# Patient Record
Sex: MALE | Race: WHITE | NOT HISPANIC OR LATINO | Employment: FULL TIME | URBAN - NONMETROPOLITAN AREA
[De-identification: names, ages, dates, MRNs, and addresses within clinical notes are randomized per-mention and may not be internally consistent; named-entity substitution may affect disease eponyms.]

---

## 2017-03-17 ENCOUNTER — HISTORY (OUTPATIENT)
Dept: EMERGENCY MEDICINE | Facility: OTHER | Age: 22
End: 2017-03-17

## 2018-02-13 ENCOUNTER — DOCUMENTATION ONLY (OUTPATIENT)
Dept: FAMILY MEDICINE | Facility: OTHER | Age: 23
End: 2018-02-13

## 2018-02-13 PROBLEM — J30.1 ALLERGIC RHINITIS DUE TO POLLEN: Status: ACTIVE | Noted: 2018-02-13

## 2018-02-13 PROBLEM — F41.1 ANXIETY STATE: Status: ACTIVE | Noted: 2018-02-13

## 2018-02-13 PROBLEM — H53.009 AMBLYOPIA: Status: ACTIVE | Noted: 2018-02-13

## 2018-02-13 PROBLEM — F91.9 DISTURBANCE OF CONDUCT: Status: ACTIVE | Noted: 2018-02-13

## 2018-02-13 RX ORDER — ALBUTEROL SULFATE 90 UG/1
2 AEROSOL, METERED RESPIRATORY (INHALATION) EVERY 4 HOURS PRN
COMMUNITY
Start: 2017-03-17

## 2018-04-27 ENCOUNTER — HOSPITAL ENCOUNTER (EMERGENCY)
Facility: OTHER | Age: 23
Discharge: HOME | End: 2018-04-27
Attending: INTERNAL MEDICINE | Admitting: INTERNAL MEDICINE
Payer: COMMERCIAL

## 2018-04-27 ENCOUNTER — APPOINTMENT (OUTPATIENT)
Dept: GENERAL RADIOLOGY | Facility: OTHER | Age: 23
End: 2018-04-27
Attending: INTERNAL MEDICINE
Payer: COMMERCIAL

## 2018-04-27 VITALS
TEMPERATURE: 96.3 F | SYSTOLIC BLOOD PRESSURE: 110 MMHG | RESPIRATION RATE: 12 BRPM | HEART RATE: 72 BPM | DIASTOLIC BLOOD PRESSURE: 69 MMHG | OXYGEN SATURATION: 99 % | HEIGHT: 71 IN

## 2018-04-27 DIAGNOSIS — M54.50 ACUTE MIDLINE LOW BACK PAIN WITHOUT SCIATICA: Primary | ICD-10-CM

## 2018-04-27 PROCEDURE — 99283 EMERGENCY DEPT VISIT LOW MDM: CPT | Performed by: INTERNAL MEDICINE

## 2018-04-27 PROCEDURE — 72100 X-RAY EXAM L-S SPINE 2/3 VWS: CPT

## 2018-04-27 PROCEDURE — 99283 EMERGENCY DEPT VISIT LOW MDM: CPT | Mod: Z6 | Performed by: INTERNAL MEDICINE

## 2018-04-27 ASSESSMENT — ENCOUNTER SYMPTOMS
DIZZINESS: 0
HEMATURIA: 0
DYSURIA: 0
ARTHRALGIAS: 0
JOINT SWELLING: 0
AGITATION: 0
ABDOMINAL PAIN: 0
NECK PAIN: 0
LIGHT-HEADEDNESS: 0
BACK PAIN: 1
SHORTNESS OF BREATH: 0
BRUISES/BLEEDS EASILY: 0

## 2018-04-27 NOTE — DISCHARGE INSTRUCTIONS

## 2018-04-27 NOTE — ED PROVIDER NOTES
History     Chief Complaint   Patient presents with     Back Pain   Mello Wilson is a 22 year old male who:    [facility] Comments: Low back pain    Patient states he has been having some constant low back pain, middle, localized, none radiation.  This been going on for about 1 week.  Started after he tried lifting and hauling a [employer] trailer and trailer tongue.  States he tried to put the trailer back onto his truck and then afterwards started having some localized back pain.  Nothing radiating down the legs.    He has been trying to limit activity.  Wearing a back brace intermittently.  Rarely using some Tylenol.  Put a month to put himself on a 50 pound lift restriction since that time.    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  He is also been renovating his home.    He would like to try getting some back x-rays today he is worried about back issue or back injury.    No other injuries reported.  No bowel or bladder incontinence.  No saddle numbness.  No other limitations reported.    The history is provided by the patient.     Problem List:    Patient Active Problem List    Diagnosis Date Noted     Acute midline low back pain without sciatica -- Approximately L5 04/27/2018     Priority: Medium     Allergic rhinitis due to pollen 02/13/2018     Priority: Medium     Overview:   spring and fall       Amblyopia 02/13/2018     Priority: Medium     Overview:   Dense (congenital)       Anxiety state 02/13/2018     Priority: Medium     Overview:   Features of agitation and possible dysthymia       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     Priority: Medium     Ingrown toenail 08/04/2012     Priority: Medium     Insomnia 08/16/2011     Priority: Medium     Asthma 05/04/2011     Priority: Medium     Overview:   No Asthma Action Plan developed       Elbow, forearm, and wrist, abrasion or friction burn, without mention of infection 04/29/2011     Priority: Medium        Past Medical History:    Past Medical History:  "  Diagnosis Date     Attention-deficit hyperactivity disorder, combined type      Child in welfare custody        Past Surgical History:    Past Surgical History:   Procedure Laterality Date     OTHER SURGICAL HISTORY      ,[id number],OTHER, Dr. Vaughn.     OTHER SURGICAL HISTORY      [id number],OTHER,left foot surgery after stepped on needle.       Family History:    Family History   Problem Relation Age of Onset     Other - See Comments Sister       shunt     DIABETES Maternal Grandmother      Diabetes     Other - See Comments Paternal Grandmother       after being on ventilator was a smoker       Social History:  Marital Status:  Single [1]  Social History   Substance Use Topics     Smoking status: Former Smoker     Packs/day: 0.10     Types: Cigarettes     Smokeless tobacco: Current User     Types: Chew     Alcohol use No        Medications:      albuterol (PROAIR HFA/PROVENTIL HFA/VENTOLIN HFA) 108 (90 BASE) MCG/ACT Inhaler         Review of Systems   HENT: Negative for congestion.    Respiratory: Negative for shortness of breath.    Cardiovascular: Negative for chest pain.   Gastrointestinal: Negative for abdominal pain.   Genitourinary: Negative for dysuria and hematuria.   Musculoskeletal: Positive for back pain. Negative for arthralgias, gait problem, joint swelling and neck pain.   Neurological: Negative for dizziness and light-headedness.   Hematological: Does not bruise/bleed easily.   Psychiatric/Behavioral: Negative for agitation.       Physical Exam   BP: 110/69  Pulse: 72  Temp: 96.3  F (35.7  C)  Resp: 12  Height: 180.3 cm (5' 11\")  SpO2: 99 %      Physical Exam   Constitutional: He appears well-developed and well-nourished. No distress.   HENT:   Head: Normocephalic and atraumatic.   Cardiovascular: Normal rate and intact distal pulses.    Pulmonary/Chest: Effort normal.   Musculoskeletal: He exhibits tenderness. He exhibits no deformity.   Mild tenderness to palpation of the L5 spinous process.  " No bruising or bleeding.  No swelling.   Neurological: He is alert.   Skin: Skin is warm and dry.   Psychiatric: He has a normal mood and affect.       ED Course     Patient was evaluated and treated.  Only minimal low back pain.  He is able to flex and extend his back with minimal difficulty.  Some localized tenderness approximately L5 spinous process to palpation.    X-rays suggest possibility of some anterior changes of L1.    Conservative measures recommended.  No heavy lifting.  Proper lifting mechanics and bending.  He is Carl started this.  He has been using a back brace intermittently which seems to help some as well.  Her graph outpatient follow-up with primary care provider as needed.    Continue back brace, ice, Tylenol as needed.    ED Course     Procedures               Results for orders placed or performed during the hospital encounter of 04/27/18 (from the past 24 hour(s))   XR Lumbar Spine 2/3 Views    Narrative    XR LUMBAR SPINE 2-3 VIEWS    HISTORY: 22 yearsMale localized low back pain - about L5, from heavy  lifting - ? compression fracture; Acute midline low back pain without  sciatica    TECHNIQUE: 3 views lumbar spine    COMPARISON: None    FINDINGS: Vertebral body height is well-maintained throughout. There  is slight irregularity or buckling of the inferior aspect of the  anterior L1 endplate. There is no evidence of subluxation or fracture  of the lumbar spine otherwise.         Impression    IMPRESSION: Subtle buckling of the inferior aspect of the anterior L1  endplate. A very mild compression fracture of L1 is possible.  Differential would include anterior osteophytic change. MRI may be  useful to confirm presence or absence of fracture if clinically  indicated.    ANISA HOLGUIN MD       Medications - No data to display    Assessments & Plan (with Medical Decision Making)     I have reviewed the nursing notes.    I have reviewed the findings, diagnosis, plan and need for follow up  with the patient.    New Prescriptions    No medications on file       Final diagnoses:   Acute midline low back pain without sciatica -- Approximately L5       4/27/2018   [facility] AND [facility] [place]     Yariel Garsia MD  04/27/18 8605

## 2018-04-27 NOTE — ED AVS SNAPSHOT
[facility]    [address]    [place] [address]    Phone:  [phone]    Fax:  [phone]                                       Mello Wilson   MRN: [id number]    Department:  [facility]   Date of Visit:  4/27/2018           Patient Information     Date Of Birth          1995        Your diagnoses for this visit were:     Acute midline low back pain without sciatica -- Approximately L5        You were seen by Yariel Garsia MD.      Follow-up Information     Please follow up.    Why:  Follow-up with primary care provider as needed.        Discharge Instructions         General Neck and Back Pain    Both neck and back pain are usually caused by injury to the muscles or ligaments of the spine. Sometimes the disks that separate each bone of the spine may cause pain by pressing on a nearby nerve. Back and neck pain may appear after a sudden twisting or bending force (such as in a car accident), or sometimes after a simple awkward movement. In either case, muscle spasm is often present and adds to the pain.  Acute neck and back pain usually gets better in 1 to 2 weeks. Pain related to disk disease, arthritis in the spinal joints or spinal stenosis (narrowing of the spinal canal) can become chronic and last for months or years.  Back and neck pain are common problems. Most people feel better in 1 or 2 weeks, and most of the rest in 1 to 2 months. Most people can remain active.  People have and describe pain differently.    Pain can be sharp, stabbing, shooting, aching, cramping, or burning    Movement, standing, bending, lifting, sitting, or walking may worsen the pain    Pain can be localized to one spot or area, or it can be more generalized    Pain can spread or radiate upwards, downwards, to the front, or go down your arms    Muscle spasm may occur.  Most of the time mechanical problems with the muscles or spine cause the pain. it is usually  caused by an injury, whether known or not, to the muscles or ligaments. While illnesses can cause back pain, it is usually not caused by a serious illness. Pain is usually related to physical activity, whether sports, exercise, work, or normal activity. Sometimes it can occur without an identifiable cause. This can happen simply by stretching or moving wrong, without noting pain at the time. Other causes include:    Overexertion, lifting, pushing, pulling incorrectly or too aggressively.    Sudden twisting, bending or stretching from an accident (car or fall), or accidental movement.    Poor posture    Poor conditioning, lack of regular exercise    Spinal disc disease or arthritis    Stress    Pregnancy, or illness like appendicitis, bladder or kidney infection, pelvic infections   Home care    For neck pain: Use a comfortable pillow that supports the head and keeps the spine in a neutral position. The position of the head should not be tilted forward or backward.    When in bed, try to find a position of comfort. A firm mattress is best. Try lying flat on your back with pillows under your knees. You can also try lying on your side with your knees bent up towards your chest and a pillow between your knees.    At first, do not try to stretch out the sore spots. If there is a strain, it is not like the good soreness you get after exercising without an injury. In this case, stretching may make it worse.    Don't sit for long periods, as in long car rides or other travel. This puts more stress on the lower back than standing or walking.    During the first 24 to 72 hours after an injury, apply an ice pack to the painful area for 20 minutes and then remove it for 20 minutes over a period of 60 to 90 minutes or several times a day.     You can alternate ice and heat therapies. Talk with your healthcare provider about the best treatment for your back or neck pain. As a safety precaution, do not use a heating pad at  bedtime. Sleeping with a heating pad can lead to skin burns or tissue damage.    Therapeutic massage can help relax the back and neck muscles without stretching them.    Be aware of safe lifting methods and do not lift anything over 15 pounds until all the pain is gone.  Medicines  Talk to your healthcare provider before using medicine, especially if you have other medical problems or are taking other medicines.    You may use over-the-counter medicine to control pain, unless another pain medicine was prescribed. If you have chronic conditions like diabetes, liver or kidney disease, stomach ulcers,  gastrointestinal bleeding, or are taking blood thinner medicines.    Be careful if you are given pain medicines, narcotics, or medicine for muscle spasm. They can cause drowsiness, and can affect your coordination, reflexes, and judgment. Do not drive or operate heavy machinery.  Follow-up care  Follow up with your healthcare provider, or as advised. Physical therapy or further tests may be needed.  If X-rays were taken, you will be notified of any new findings that may affect your care.  Call 911  Call 911 if any of the following occur:    Trouble breathing    Confusion    Very drowsy or trouble awakening    Fainting or loss of consciousness    Rapid or very slow heart rate    Loss of bowel or bladder control  When to seek medical advice  Call your healthcare provider right away if any of these occur:    Pain becomes worse or spreads into your arms or legs    Weakness, numbness or pain in one or both arms or legs    Numbness in the groin area    Difficulty walking    Fever of 100.4 F (38 C) or higher, or as directed by your healthcare provider  Date Last Reviewed: 7/1/2016 2000-2017 [employer]. [address] [place] [address], [place] [address]. All rights reserved. This information is not intended as a substitute for professional medical care. Always follow your healthcare professional's  instructions.          Discharge References/Attachments     BACK SPRAIN/STRAIN (ENGLISH)      24 Hour Appointment Hotline       To make an appointment at any [facility], call [phone] ([phone]). If you don't have a family doctor or clinic, we will help you find one. [facility] clinics are conveniently located to serve the needs of you and your family.             Review of your medicines      Our records show that you are taking the medicines listed below. If these are incorrect, please call your family doctor or clinic.        Dose / Directions Last dose taken    albuterol 108 (90 Base) MCG/ACT Inhaler   Commonly known as:  PROAIR HFA/PROVENTIL HFA/VENTOLIN HFA   Dose:  2 puff        Inhale 2 puffs into the lungs every 4 hours as needed   Refills:  0                Procedures and tests performed during your visit     XR Lumbar Spine 2/3 Views      Orders Needing Specimen Collection     None      Pending Results     No orders found from 4/25/2018 to 4/28/2018.            Pending Culture Results     No orders found from 4/25/2018 to 4/28/2018.            Pending Results Instructions     If you had any lab results that were not finalized at the time of your Discharge, you can call the ED Lab Result RN at [phone]. You will be contacted by this team for any positive Lab results or changes in treatment. The nurses are available 7 days a week from 10A to 6:30P.  You can leave a message 24 hours per day and they will return your call.        Thank you for choosing [facility]       Thank you for choosing [facility] for your care. Our goal is always to provide you with excellent care. Hearing back from our patients is one way we can continue to improve our services. Please take a few minutes to complete the written survey that you may receive in the mail after you visit with us. Thank you!        [employer] Information     [employer] lets you send messages to your doctor, view your test results, renew your  "prescriptions, schedule appointments and more. To sign up, go to [email or web address] . Click on \"Log in\" on the left side of the screen, which will take you to the Welcome page. Then click on \"Sign up Now\" on the right side of the page.     You will be asked to enter the access code listed below, as well as some personal information. Please follow the directions to create your username and password.     Your access code is: [email or web address]  Expires: 2018  2:32 PM     Your access code will  in 90 days. If you need help or a new code, please call your [place] clinic or [phone].        Care EveryWhere ID     This is your Care EveryWhere ID. This could be used by other organizations to access your [place] medical records  [id number]        Equal Access to Services     EVERETT VASQUEZ : Megan Stanley, leonel costello, sonia rodrigues, parul de la fuente . [facility] [phone].    ATENCIÓN: Si habla español, tiene a simon disposición servicios gratuitos de asistencia lingüística. Llame al [phone].    We comply with applicable federal civil rights laws and [place] laws. We do not discriminate on the basis of race, color, national origin, age, disability, sex, sexual orientation, or gender identity.            After Visit Summary       This is your record. Keep this with you and show to your community pharmacist(s) and doctor(s) at your next visit.                  "

## 2018-04-27 NOTE — ED AVS SNAPSHOT
[facility]    [address]    [place] [address]    Phone:  [phone]    Fax:  [phone]                                       Mello Wilson   MRN: [id number]    Department:  [facility]   Date of Visit:  4/27/2018           After Visit Summary Signature Page     I have received my discharge instructions, and my questions have been answered. I have discussed any challenges I see with this plan with the nurse or doctor.    ..........................................................................................................................................  Patient/Patient Representative Signature      ..........................................................................................................................................  Patient Representative Print Name and Relationship to Patient    ..................................................               ................................................  Date                                            Time    ..........................................................................................................................................  Reviewed by Signature/Title    ...................................................              ..............................................  Date                                                            Time

## 2018-07-13 ENCOUNTER — HOSPITAL ENCOUNTER (EMERGENCY)
Facility: OTHER | Age: 23
Discharge: HOME | End: 2018-07-13
Attending: EMERGENCY MEDICINE | Admitting: EMERGENCY MEDICINE
Payer: COMMERCIAL

## 2018-07-13 ENCOUNTER — APPOINTMENT (OUTPATIENT)
Dept: GENERAL RADIOLOGY | Facility: OTHER | Age: 23
End: 2018-07-13
Attending: EMERGENCY MEDICINE
Payer: COMMERCIAL

## 2018-07-13 VITALS
SYSTOLIC BLOOD PRESSURE: 116 MMHG | OXYGEN SATURATION: 100 % | HEART RATE: 69 BPM | RESPIRATION RATE: 16 BRPM | HEIGHT: 71 IN | DIASTOLIC BLOOD PRESSURE: 73 MMHG | BODY MASS INDEX: 18.2 KG/M2 | TEMPERATURE: 97.1 F | WEIGHT: 130 LBS

## 2018-07-13 DIAGNOSIS — S93.401A SPRAIN OF RIGHT ANKLE, UNSPECIFIED LIGAMENT, INITIAL ENCOUNTER: Primary | ICD-10-CM

## 2018-07-13 PROCEDURE — 73562 X-RAY EXAM OF KNEE 3: CPT | Mod: RT

## 2018-07-13 PROCEDURE — 99284 EMERGENCY DEPT VISIT MOD MDM: CPT | Mod: 25 | Performed by: EMERGENCY MEDICINE

## 2018-07-13 PROCEDURE — 73610 X-RAY EXAM OF ANKLE: CPT | Mod: RT

## 2018-07-13 PROCEDURE — 99283 EMERGENCY DEPT VISIT LOW MDM: CPT | Mod: Z6 | Performed by: EMERGENCY MEDICINE

## 2018-07-13 RX ORDER — IBUPROFEN 800 MG/1
800 TABLET, FILM COATED ORAL EVERY 8 HOURS PRN
Qty: 60 TABLET | Refills: 0 | Status: SHIPPED | OUTPATIENT
Start: 2018-07-13 | End: 2018-07-21

## 2018-07-13 NOTE — ED PROVIDER NOTES
History     Chief Complaint   Patient presents with     Ankle Pain     HPI  Mello Wilson is a 22 year old male who presents to the emergency department with complaints of right ankle pain.  Patient sprained the right ankle yesterday while running around playing with his girlfriend.  He also fell down hitting the right knee on the floor.  Now is complaining of pain in both medial and lateral malleoli of the right ankle and also right knee pain.  Is requesting x-rays to rule out any bony injuries.    Problem List:    Patient Active Problem List    Diagnosis Date Noted     Acute midline low back pain without sciatica -- Approximately L5 04/27/2018     Priority: Medium     Allergic rhinitis due to pollen 02/13/2018     Priority: Medium     Overview:   spring and fall       Amblyopia 02/13/2018     Priority: Medium     Overview:   Dense (congenital)       Anxiety state 02/13/2018     Priority: Medium     Overview:   Features of agitation and possible dysthymia       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     Priority: Medium     Ingrown toenail 08/04/2012     Priority: Medium     Insomnia 08/16/2011     Priority: Medium     Asthma 05/04/2011     Priority: Medium     Overview:   No Asthma Action Plan developed       Elbow, forearm, and wrist, abrasion or friction burn, without mention of infection 04/29/2011     Priority: Medium        Past Medical History:    Past Medical History:   Diagnosis Date     Attention-deficit hyperactivity disorder, combined type      Child in welfare custody        Past Surgical History:    Past Surgical History:   Procedure Laterality Date     OTHER SURGICAL HISTORY      2002,600000,OTHER, Dr. Vaughn.     OTHER SURGICAL HISTORY      600000,OTHER,left foot surgery after stepped on needle.       Family History:    Family History   Problem Relation Age of Onset     Other - See Comments Sister       shunt     Diabetes Maternal Grandmother      Diabetes     Other - See Comments Paternal Grandmother  "      after being on ventilator was a smoker       Social History:  Marital Status:  Single [1]  Social History   Substance Use Topics     Smoking status: Former Smoker     Packs/day: 0.10     Types: Cigarettes     Smokeless tobacco: Current User     Types: Chew     Alcohol use 0.0 oz/week      Comment: occasionally        Medications:      Acetaminophen (TYLENOL PO)   albuterol (PROAIR HFA/PROVENTIL HFA/VENTOLIN HFA) 108 (90 BASE) MCG/ACT Inhaler   ibuprofen (ADVIL/MOTRIN) 800 MG tablet         Review of Systems   All other systems reviewed and are negative.      Physical Exam   BP: 122/83  Pulse: 87  Temp: 96.9  F (36.1  C)  Resp: 16  Height: 180.3 cm (5' 11\")  Weight: 59 kg (130 lb)  SpO2: 98 %      Physical Exam   Constitutional: He appears well-developed and well-nourished. No distress.   Cardiovascular: Normal rate, regular rhythm and normal heart sounds.    Pulmonary/Chest: Effort normal and breath sounds normal. No respiratory distress. He has no wheezes.   Musculoskeletal:        Feet:    Skin: He is not diaphoretic.   Nursing note and vitals reviewed.      ED Course   Patient evaluated and x-ray of the right knee and right ankle ordered.    ED Course     Procedures       Results for orders placed or performed during the hospital encounter of 18 (from the past 24 hour(s))   XR Ankle Right G/E 3 Views    Narrative    PROCEDURE:  XR ANKLE RT G/E 3 VW, XR KNEE RT 3 VW    HISTORY: Right ankle pain post fall; . Right knee pain post fall;    COMPARISON:  None.    TECHNIQUE:  3 views right ankle, 3 views right knee.    FINDINGS:  Images of the right ankle demonstrate no substantial  swelling or acute fracture. There is slight prominence of the lateral  tibiotalar joint relative to the medial tibiotalar joint, potentially  due to beam angulation. Correlate for any clinical evidence of ankle  instability.    3 views of the right knee demonstrate no acute fracture. The joint  spaces are preserved. No " suprapatellar effusion is seen.     GAYATHRI SADLER MD   XR Knee Right 3 Views    Narrative    PROCEDURE:  XR ANKLE RT G/E 3 VW, XR KNEE RT 3 VW    HISTORY: Right ankle pain post fall; . Right knee pain post fall;    COMPARISON:  None.    TECHNIQUE:  3 views right ankle, 3 views right knee.    FINDINGS:  Images of the right ankle demonstrate no substantial  swelling or acute fracture. There is slight prominence of the lateral  tibiotalar joint relative to the medial tibiotalar joint, potentially  due to beam angulation. Correlate for any clinical evidence of ankle  instability.    3 views of the right knee demonstrate no acute fracture. The joint  spaces are preserved. No suprapatellar effusion is seen.     GAYATHRI SADLER MD       Medications - No data to display    Assessments & Plan (with Medical Decision Making)   Right ankle sprain: X-rays done of the right knee and right ankle were negative for any acute fractures.  Patient discharged home on Motrin as needed for pain and walking brace for the right ankle.  Advised follow-up with PCP in 1 week.  Answered all questions and subsequently discharged home.    I have reviewed the nursing notes.    I have reviewed the findings, diagnosis, plan and need for follow up with the patient.    Discharge Medication List as of 7/13/2018  9:51 AM      START taking these medications    Details   ibuprofen (ADVIL/MOTRIN) 800 MG tablet Take 1 tablet (800 mg) by mouth every 8 hours as needed for moderate pain, Disp-60 tablet, R-0, E-Prescribe             Final diagnoses:   Sprain of right ankle, unspecified ligament, initial encounter       7/13/2018   [facility] AND [facility] HospitalRussell eastman MD  07/13/18 4131

## 2018-07-13 NOTE — DISCHARGE INSTRUCTIONS
Understanding Ankle Sprain    The ankle is the joint where the leg and foot meet. Bones are held in place by connective tissue called ligaments. When ankle ligaments are stretched to the point of pain and injury, it is called an ankle sprain. A sprain can tear the ligaments. These tears can be very small but still cause pain. Ankle sprains can be mild or severe.  What causes an ankle sprain?  A sprain may occur when you twist your ankle or bend it too far. This can happen when you stumble or fall. Things that can make an ankle sprain more likely include:    Having had an ankle sprain before    Playing sports that involve running and jumping. Or playing contact sports such as football or hockey.    Wearing shoes that don t support your feet and ankles well    Having ankles with poor strength and flexibility  Symptoms of an ankle sprain  Symptoms may include:    Pain or soreness in the ankle    Swelling    Redness or bruising    Not being able to walk or put weight on the affected foot    Reduced range of motion in the ankle    A popping or tearing feeling at the time the sprain occurs    An abnormal or dislocated look to the ankle    Instability or too much range of motion in the ankle  Treatment for an ankle sprain  Treatment focuses on reducing pain and swelling, and avoiding further injury. Treatments may include:    Resting the ankle. Avoid putting weight on it. This may mean using crutches until the sprain heals.    Prescription or over-the-counter pain medicines. These help reduce swelling and pain.    Cold packs. These help reduce pain and swelling.    Raising your ankle above your heart. This helps reduce swelling.    Wrapping the ankle with an elastic bandage or ankle brace. This helps reduce swelling and gives some support to the ankle. In rare cases, you may need a cast or boot.    Stretching and other exercises. These improve flexibility and strength.    Heat packs. These may be recommended before doing  ankle exercises.  Possible complications of an ankle sprain  An ankle that has been weakened by a sprain can be more likely to have repeated sprains afterward. Doing exercises to strengthen your ankle and improve balance can reduce your risk for repeated sprains. Other possible complications are long-term (chronic) pain or an ankle that remains unstable.  When to call your healthcare provider  Call your healthcare provider right away if you have any of these:    Fever of 100.4 F (38 C) or higher, or as directed    Pain, numbness, discoloration, or coldness in the foot or toes    Pain that gets worse    Symptoms that don t get better, or get worse    New symptoms   Date Last Reviewed: 3/10/2016    2164-7716 [employer]. [address] [place] [address], [place] [address]. All rights reserved. This information is not intended as a substitute for professional medical care. Always follow your healthcare professional's instructions.

## 2018-07-13 NOTE — LETTER
July 13, 2018      Mello Wilson  [address] [place] [id number]        To Whom It May Concern:    Mello Wilson was seen in our emergency department. He may return to work without restrictions.      Sincerely,      Russell King MD on 7/13/2018 at 10:06 AM

## 2018-07-13 NOTE — ED AVS SNAPSHOT
[facility]    [address]    [place] [address]    Phone:  [phone]    Fax:  [phone]                                       Mello Wilson   MRN: [id number]    Department:  [facility]   Date of Visit:  7/13/2018           Patient Information     Date Of Birth          1995        Your diagnoses for this visit were:     Sprain of right ankle, unspecified ligament, initial encounter        You were seen by Russell King MD.        Discharge Instructions         Understanding Ankle Sprain    The ankle is the joint where the leg and foot meet. Bones are held in place by connective tissue called ligaments. When ankle ligaments are stretched to the point of pain and injury, it is called an ankle sprain. A sprain can tear the ligaments. These tears can be very small but still cause pain. Ankle sprains can be mild or severe.  What causes an ankle sprain?  A sprain may occur when you twist your ankle or bend it too far. This can happen when you stumble or fall. Things that can make an ankle sprain more likely include:    Having had an ankle sprain before    Playing sports that involve running and jumping. Or playing contact sports such as football or hockey.    Wearing shoes that don t support your feet and ankles well    Having ankles with poor strength and flexibility  Symptoms of an ankle sprain  Symptoms may include:    Pain or soreness in the ankle    Swelling    Redness or bruising    Not being able to walk or put weight on the affected foot    Reduced range of motion in the ankle    A popping or tearing feeling at the time the sprain occurs    An abnormal or dislocated look to the ankle    Instability or too much range of motion in the ankle  Treatment for an ankle sprain  Treatment focuses on reducing pain and swelling, and avoiding further injury. Treatments may include:    Resting the ankle. Avoid putting weight on it. This may mean using  crutches until the sprain heals.    Prescription or over-the-counter pain medicines. These help reduce swelling and pain.    Cold packs. These help reduce pain and swelling.    Raising your ankle above your heart. This helps reduce swelling.    Wrapping the ankle with an elastic bandage or ankle brace. This helps reduce swelling and gives some support to the ankle. In rare cases, you may need a cast or boot.    Stretching and other exercises. These improve flexibility and strength.    Heat packs. These may be recommended before doing ankle exercises.  Possible complications of an ankle sprain  An ankle that has been weakened by a sprain can be more likely to have repeated sprains afterward. Doing exercises to strengthen your ankle and improve balance can reduce your risk for repeated sprains. Other possible complications are long-term (chronic) pain or an ankle that remains unstable.  When to call your healthcare provider  Call your healthcare provider right away if you have any of these:    Fever of 100.4 F (38 C) or higher, or as directed    Pain, numbness, discoloration, or coldness in the foot or toes    Pain that gets worse    Symptoms that don t get better, or get worse    New symptoms   Date Last Reviewed: 3/10/2016    1858-4903 [employer]. [address] [place] [address]. All rights reserved. This information is not intended as a substitute for professional medical care. Always follow your healthcare professional's instructions.          24 Hour Appointment Hotline     To schedule an appointment at [facility], please call [phone]. If you don't have a family doctor or clinic, we will help you find one. [facility] clinics are conveniently located to serve the needs of you and your family.        ED Discharge Orders     Walking brace                    Review of your medicines      START taking        Dose / Directions Last dose taken    ibuprofen 800 MG tablet   Commonly known as:   ADVIL/MOTRIN   Dose:  800 mg   Quantity:  60 tablet        Take 1 tablet (800 mg) by mouth every 8 hours as needed for moderate pain   Refills:  0          Our records show that you are taking the medicines listed below. If these are incorrect, please call your family doctor or clinic.        Dose / Directions Last dose taken    albuterol 108 (90 Base) MCG/ACT Inhaler   Commonly known as:  PROAIR HFA/PROVENTIL HFA/VENTOLIN HFA   Dose:  2 puff        Inhale 2 puffs into the lungs every 4 hours as needed   Refills:  0        TYLENOL PO   Dose:  1000 mg        Take 1,000 mg by mouth once   Refills:  0                Prescriptions were sent or printed at these locations (1 Prescription)                   [facility] [address] [place], [place] - [address] AT [address] [facility] [address]   [address] [facility] [address]    Telephone:  [phone]   Fax:  [phone]   Hours:                  E-Prescribed (1 of 1)         ibuprofen (ADVIL/MOTRIN) 800 MG tablet                Procedures and tests performed during your visit     XR Ankle Right G/E 3 Views    XR Knee Right 3 Views      Orders Needing Specimen Collection     None      Pending Results     No orders found from 7/11/2018 to 7/14/2018.            Pending Culture Results     No orders found from 7/11/2018 to 7/14/2018.            Pending Results Instructions     If you had any lab results that were not finalized at the time of your Discharge, you can call the ED Lab Result RN at [phone]. You will be contacted by this team for any positive Lab results or changes in treatment. The nurses are available 7 days a week from 10A to 6:30P.  You can leave a message 24 hours per day and they will return your call.        Thank you for choosing [facility]       Thank you for choosing [facility] for your care. Our goal is always to provide you with excellent care. Hearing back from our patients is one way we can continue to improve our services. Please take  "a few minutes to complete the written survey that you may receive in the mail after you visit with us. Thank you!        [employer] Information     [employer] lets you send messages to your doctor, view your test results, renew your prescriptions, schedule appointments and more. To sign up, go to [email or web address] . Click on \"Log in\" on the left side of the screen, which will take you to the Welcome page. Then click on \"Sign up Now\" on the right side of the page.     You will be asked to enter the access code listed below, as well as some personal information. Please follow the directions to create your username and password.     Your access code is: [email or web address]  Expires: 2018  2:32 PM     Your access code will  in 90 days. If you need help or a new code, please call your [facility] clinic or [phone].        Care EveryWhere ID     This is your Care EveryWhere ID. This could be used by other organizations to access your [facility] medical records  [id number]        Equal Access to Services     [facility]: Hadkimberley Stanley, waaxda trang, qaybta kaalpilar rodrigues, parul de la fuente . So [facility] [phone].    ATENCIÓN: Si habla español, tiene a simon disposición servicios gratuitos de asistencia lingüística. Llame al [phone].    We comply with applicable federal civil rights laws and [place] laws. We do not discriminate on the basis of race, color, national origin, age, disability, sex, sexual orientation, or gender identity.            After Visit Summary       This is your record. Keep this with you and show to your community pharmacist(s) and doctor(s) at your next visit.                  "

## 2018-07-13 NOTE — ED TRIAGE NOTES
"Pt tripped and twisted rt ankle and \"heard a snap\". Pt reports worsening pain today. Pt reports injury happened yesterday at 2030.  "

## 2018-07-13 NOTE — ED AVS SNAPSHOT
[facility]    [address]    [place] [address]    Phone:  [phone]    Fax:  [phone]                                       Mello Wilson   MRN: [id number]    Department:  [facility]   Date of Visit:  7/13/2018           After Visit Summary Signature Page     I have received my discharge instructions, and my questions have been answered. I have discussed any challenges I see with this plan with the nurse or doctor.    ..........................................................................................................................................  Patient/Patient Representative Signature      ..........................................................................................................................................  Patient Representative Print Name and Relationship to Patient    ..................................................               ................................................  Date                                            Time    ..........................................................................................................................................  Reviewed by Signature/Title    ...................................................              ..............................................  Date                                                            Time

## 2018-11-30 DIAGNOSIS — Z20.2 EXPOSURE TO CHLAMYDIA: Primary | ICD-10-CM

## 2018-11-30 RX ORDER — AZITHROMYCIN 1 G/1
1 POWDER, FOR SUSPENSION ORAL ONCE
Qty: 1 EACH | Refills: 0 | Status: CANCELLED | OUTPATIENT
Start: 2018-11-30 | End: 2018-11-30

## 2018-11-30 RX ORDER — AZITHROMYCIN 500 MG/1
1000 TABLET, FILM COATED ORAL ONCE
Qty: 2 TABLET | Refills: 0 | Status: SHIPPED | OUTPATIENT
Start: 2018-11-30 | End: 2018-11-30

## 2018-11-30 NOTE — TELEPHONE ENCOUNTER
Patient calls today stating his partner tested positive for chlamydia and he would like expedited partner treatment sent to [facility]'s pharmacy. Advised him to call if he does not tolerate medication and no intercourse for 7 days after treatment has been completed.    Nereyda Chris RN...................11/30/2018 3:59 PM

## 2019-09-29 ENCOUNTER — OFFICE VISIT (OUTPATIENT)
Dept: FAMILY MEDICINE | Facility: OTHER | Age: 24
End: 2019-09-29
Attending: FAMILY MEDICINE
Payer: COMMERCIAL

## 2019-09-29 VITALS
WEIGHT: 140.1 LBS | HEART RATE: 69 BPM | TEMPERATURE: 98.1 F | BODY MASS INDEX: 19.61 KG/M2 | SYSTOLIC BLOOD PRESSURE: 124 MMHG | DIASTOLIC BLOOD PRESSURE: 80 MMHG | RESPIRATION RATE: 16 BRPM | OXYGEN SATURATION: 98 % | HEIGHT: 71 IN

## 2019-09-29 DIAGNOSIS — R21 RASH OF GENITAL AREA: Primary | ICD-10-CM

## 2019-09-29 PROCEDURE — G0463 HOSPITAL OUTPT CLINIC VISIT: HCPCS

## 2019-09-29 PROCEDURE — 99214 OFFICE O/P EST MOD 30 MIN: CPT | Performed by: FAMILY MEDICINE

## 2019-09-29 RX ORDER — CLOTRIMAZOLE AND BETAMETHASONE DIPROPIONATE 10; .64 MG/G; MG/G
CREAM TOPICAL 2 TIMES DAILY
Qty: 30 G | Refills: 0 | Status: SHIPPED | OUTPATIENT
Start: 2019-09-29 | End: 2020-04-23

## 2019-09-29 ASSESSMENT — PAIN SCALES - GENERAL: PAINLEVEL: MODERATE PAIN (5)

## 2019-09-29 ASSESSMENT — MIFFLIN-ST. JEOR: SCORE: 1657.62

## 2019-09-29 NOTE — PROGRESS NOTES
"Chief complaint rash in groin area    HPI: 23-year-old white male presents with his girlfriend and their 3-month-old baby to have a groin rash evaluated.  This is been mildly pruritic and some inflammation noted.  He initially wanted to only show me a texture of the area on his phone.  The picture looked markedly abnormal, however when I examined the patient the findings were rather minimal in appearance.  He does not have any  symptoms he has not had fever general he is healthy.  He does wear tight workout undergarments that may be contributing.  Patient and his significant other were concerned that he may have \"bedbugs\" but there does not sound as though there is any exposure risk for them.  The    Past medical history is of no great significance he does have a history of asthma and allergic rhinitis and some sciatica problems  Medications are unremarkable  Allergies are none medications  Review of systems other complaints on full review  Exam: Alert gentleman not appearing to be in any distress  Genitalia exam is entirely normal.  He has normal penis that is circumcised, both testicles are normal, and he does not appear to have any marked dermatologic inflammatory development.  He does have faint erythema in the inguinal crease consistent with possible early tinea crura's    Assessment possible tinea crura  Plan will be Lotrisone cream topically 0.05% twice daily and if becomes acutely worse or not resolved with 10 to 14 days to be rechecked.  "

## 2019-09-29 NOTE — PATIENT INSTRUCTIONS
Patient Education     Jock Itch  Jock itch is a rash caused by a fungal infection. It occurs in the skin fold between the thigh and groin where it is warm and moist.  It starts as a small, red, itchy patch that grows larger in the shape of a round ring, 1- to 2- inches wide, and may cause the skin to flake. It may also spread to the scrotum or the skin that covers your testicles. This infection is treated with skin creams or oral medicine.  Home care  The following will help you care for yourself at home:    If you were prescribed a cream, it should be applied exactly as directed. Some antifungal creams are available without a prescription.    It may take a week before the fungus starts to go away and it can take about 2 to 3 weeks to completely clear. To prevent recurrence, it is important to keep using the medicine until the rash is all gone.    Wash the groin area at least once a day with soap and water. Pat dry and apply the medicine. Change to freshly laundered underwear daily.    Once the rash is gone, keep the area clean and dry to keep it from coming back. If recurrence is a problem, use a medicated antifungal powder daily in the groin area.  Prevention  The following tips may help prevent jock itch:    Don't share clothes, towels, or sports gear with others unless they have been washed.    Change underwear daily.    Keep skin clean and dry, especially after showering or swimming.    Lose weight.    Do not wear tight underwear.    Treat athlete s foot if it occurs.  Follow-up care  Follow up with your healthcare provider as advised by our staff if the rash is not starting to get better after 10 days of treatment or if the rash continues to spread.  When to seek medical care  Get prompt medical attention if any of the following occur:    Fluid draining from the rash    Increasing redness around the rash    Rash returns soon after treatment  Date Last Reviewed: 8/1/2016 2000-2018 [employer]  [address] [place] [address]. All rights reserved. This information is not intended as a substitute for professional medical care. Always follow your healthcare professional's instructions.

## 2019-10-30 ENCOUNTER — ALLIED HEALTH/NURSE VISIT (OUTPATIENT)
Dept: FAMILY MEDICINE | Facility: OTHER | Age: 24
End: 2019-10-30
Payer: COMMERCIAL

## 2019-10-30 DIAGNOSIS — Z23 NEED FOR PROPHYLACTIC VACCINATION AND INOCULATION AGAINST INFLUENZA: Primary | ICD-10-CM

## 2019-10-30 PROCEDURE — 90471 IMMUNIZATION ADMIN: CPT

## 2019-10-30 PROCEDURE — 90686 IIV4 VACC NO PRSV 0.5 ML IM: CPT

## 2020-04-23 ENCOUNTER — VIRTUAL VISIT (OUTPATIENT)
Dept: FAMILY MEDICINE | Facility: OTHER | Age: 25
End: 2020-04-23
Attending: FAMILY MEDICINE
Payer: COMMERCIAL

## 2020-04-23 VITALS — TEMPERATURE: 98.8 F | WEIGHT: 145 LBS | HEIGHT: 71 IN | BODY MASS INDEX: 20.3 KG/M2

## 2020-04-23 DIAGNOSIS — F41.9 ANXIETY AND DEPRESSION: Primary | ICD-10-CM

## 2020-04-23 DIAGNOSIS — F32.A ANXIETY AND DEPRESSION: Primary | ICD-10-CM

## 2020-04-23 PROCEDURE — 99442 ZZC PHYSICIAN TELEPHONE EVALUATION 11-20 MIN: CPT | Performed by: FAMILY MEDICINE

## 2020-04-23 RX ORDER — ESCITALOPRAM OXALATE 10 MG/1
10 TABLET ORAL DAILY
Qty: 30 TABLET | Refills: 1 | Status: SHIPPED | OUTPATIENT
Start: 2020-04-23 | End: 2020-06-25

## 2020-04-23 ASSESSMENT — ANXIETY QUESTIONNAIRES
1. FEELING NERVOUS, ANXIOUS, OR ON EDGE: SEVERAL DAYS
3. WORRYING TOO MUCH ABOUT DIFFERENT THINGS: SEVERAL DAYS
6. BECOMING EASILY ANNOYED OR IRRITABLE: SEVERAL DAYS
5. BEING SO RESTLESS THAT IT IS HARD TO SIT STILL: SEVERAL DAYS
7. FEELING AFRAID AS IF SOMETHING AWFUL MIGHT HAPPEN: NOT AT ALL
GAD7 TOTAL SCORE: 6
IF YOU CHECKED OFF ANY PROBLEMS ON THIS QUESTIONNAIRE, HOW DIFFICULT HAVE THESE PROBLEMS MADE IT FOR YOU TO DO YOUR WORK, TAKE CARE OF THINGS AT HOME, OR GET ALONG WITH OTHER PEOPLE: SOMEWHAT DIFFICULT
2. NOT BEING ABLE TO STOP OR CONTROL WORRYING: SEVERAL DAYS

## 2020-04-23 ASSESSMENT — PATIENT HEALTH QUESTIONNAIRE - PHQ9
5. POOR APPETITE OR OVEREATING: SEVERAL DAYS
SUM OF ALL RESPONSES TO PHQ QUESTIONS 1-9: 10

## 2020-04-23 ASSESSMENT — PAIN SCALES - GENERAL: PAINLEVEL: NO PAIN (0)

## 2020-04-23 ASSESSMENT — MIFFLIN-ST. JEOR: SCORE: 1674.85

## 2020-04-23 NOTE — PROGRESS NOTES
"Mello Wilson is a 24 year old male who is being evaluated via a billable telephone visit.      The patient has been notified of following:     \"This telephone visit will be conducted via a call between you and your physician/provider. We have found that certain health care needs can be provided without the need for a physical exam.  This service lets us provide the care you need with a short phone conversation.  If a prescription is necessary we can send it directly to your pharmacy.  If lab work is needed we can place an order for that and you can then stop by our lab to have the test done at a later time.    Telephone visits are billed at different rates depending on your insurance coverage. During this emergency period, for some insurers they may be billed the same as an in-person visit.  Please reach out to your insurance provider with any questions.    If during the course of the call the physician/provider feels a telephone visit is not appropriate, you will not be charged for this service.\"    Patient has given verbal consent for Telephone visit?  Yes    How would you like to obtain your AVS? Mail a copy    Subjective     Mello Wilson is a 24 year old male who presents to clinic today for the following health issues:    HPI    Depression, Anxiety:  Diagnosed in his teenage years and was managed on Lexapro for awhile.  He reports that this helped with his symptoms but he stopped taking the medicine at age 20 as he no longer felt that he needed it.  He has been able to manage his symptoms without medication up until recently.  He reports experiencing low mood, decreased interest in hobbies, feeling \"fidgety\" and \"ansty,\" distractibility, easily overwhelmed, self-critical, and emotional lability.  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    Patient Active Problem List   Diagnosis     Elbow, forearm, and wrist, abrasion or " friction burn, without mention of infection     Allergic rhinitis due to pollen     Amblyopia     Anxiety state     Asthma     Ingrown toenail     Insomnia     Disturbance of conduct     Acute midline low back pain without sciatica -- Approximately L5     Past Surgical History:   Procedure Laterality Date     OTHER SURGICAL HISTORY      ,OTHER, Dr. Vaughn.     OTHER SURGICAL HISTORY      ,OTHER,left foot surgery after stepped on needle.       Social History     Tobacco Use     Smoking status: Former Smoker     Packs/day: 0.10     Types: Cigarettes     Smokeless tobacco: Current User     Types: Chew   Substance Use Topics     Alcohol use: Yes     Alcohol/week: 0.0 standard drinks     Comment: occasionally     Family History   Problem Relation Age of Onset     Other - See Comments Sister          shunt     Diabetes Maternal Grandmother         Diabetes     Other - See Comments Paternal Grandmother          after being on ventilator was a smoker         Current Outpatient Medications   Medication Sig Dispense Refill     Acetaminophen (TYLENOL PO) Take 1,000 mg by mouth once       albuterol (PROAIR HFA/PROVENTIL HFA/VENTOLIN HFA) 108 (90 BASE) MCG/ACT Inhaler Inhale 2 puffs into the lungs every 4 hours as needed       escitalopram (LEXAPRO) 10 MG tablet Take 1 tablet (10 mg) by mouth daily 30 tablet 1     Allergies   Allergen Reactions     Dust Mite Extract      Other reaction(s): Angioedema, Erythema, Runny Nose  Itchy eyes     Nicotine GI Disturbance     No Clinical Screening - See Comments      Other reaction(s): Runny Nose  All Animal hair     Pollen Extract      Other reaction(s): Angioedema, Erythema, Runny Nose  Itchy eyes     Reviewed and updated as needed this visit by Provider    Review of Systems   Constitutional: Positive for appetite change.   Psychiatric/Behavioral: Positive for sleep disturbance. Negative for suicidal ideas.   No homicidal ideation.    Objective   Reported vitals:   "Temp 98.8  F (37.1  C) (Tympanic)   Ht 1.803 m (5' 11\")   Wt 65.8 kg (145 lb)   BMI 20.22 kg/m     no distress  PSYCH: Alert and oriented times 3; coherent speech, normal   rate and volume, able to articulate logical thoughts, able   to abstract reason, no tangential thoughts, no hallucinations   or delusions  His affect is normal  RESP: No cough, no audible wheezing, able to talk in full sentences  Remainder of exam unable to be completed due to telephone visits    Diagnostic Test Results:  Labs reviewed in Epic    Assessment/Plan:  1. Anxiety and depression  Discussed serotonin specific reuptake inhibitor/SNRI medications as first line treatment for both anxiety and depression.  Informed him that these medications are safe for long-term use and are not habit-forming or addicting.  Given that he has symptoms of both anxiety and depression and has done well with Lexapro in the past, we made a mutual decision to try this medication again.  Discussed that it will take 4-6 weeks to feel full effects of medication.  Counseled on potential adverse effects and possibility of suicidal ideation in first two weeks of therapy.  Discussed that this is a medical emergency requiring immediate evaluation; he verbalized understanding.  Start Lexapro 10 mg daily.  Encouraged daily exercise and participation in counseling when pandemic restrictions are loosened for further impact.  - escitalopram (LEXAPRO) 10 MG tablet; Take 1 tablet (10 mg) by mouth daily  Dispense: 30 tablet; Refill: 1    Return in about 4 weeks (around 5/21/2020) for Anxiety/Depression.      Phone call duration:  11 minutes    Tiffanie Salas DO        "

## 2020-04-24 ASSESSMENT — ASTHMA QUESTIONNAIRES: ACT_TOTALSCORE: 24

## 2020-04-24 ASSESSMENT — ANXIETY QUESTIONNAIRES: GAD7 TOTAL SCORE: 6

## 2020-04-27 ASSESSMENT — ENCOUNTER SYMPTOMS
SLEEP DISTURBANCE: 1
APPETITE CHANGE: 1

## 2020-07-06 ENCOUNTER — OFFICE VISIT (OUTPATIENT)
Dept: FAMILY MEDICINE | Facility: OTHER | Age: 25
End: 2020-07-06
Attending: PHYSICIAN ASSISTANT
Payer: COMMERCIAL

## 2020-07-06 VITALS
WEIGHT: 138.2 LBS | OXYGEN SATURATION: 97 % | HEART RATE: 80 BPM | RESPIRATION RATE: 16 BRPM | BODY MASS INDEX: 19.27 KG/M2 | SYSTOLIC BLOOD PRESSURE: 126 MMHG | DIASTOLIC BLOOD PRESSURE: 80 MMHG | TEMPERATURE: 99.4 F

## 2020-07-06 DIAGNOSIS — B36.0 TINEA VERSICOLOR: Primary | ICD-10-CM

## 2020-07-06 PROCEDURE — 99213 OFFICE O/P EST LOW 20 MIN: CPT | Performed by: PHYSICIAN ASSISTANT

## 2020-07-06 PROCEDURE — G0463 HOSPITAL OUTPT CLINIC VISIT: HCPCS

## 2020-07-06 RX ORDER — KETOCONAZOLE 20 MG/G
CREAM TOPICAL DAILY
Qty: 30 G | Refills: 0 | Status: SHIPPED | OUTPATIENT
Start: 2020-07-06 | End: 2021-01-14

## 2020-07-06 ASSESSMENT — PAIN SCALES - GENERAL: PAINLEVEL: NO PAIN (0)

## 2020-07-06 ASSESSMENT — PATIENT HEALTH QUESTIONNAIRE - PHQ9: SUM OF ALL RESPONSES TO PHQ QUESTIONS 1-9: 3

## 2020-07-06 NOTE — NURSING NOTE
Chief Complaint   Patient presents with     Derm Problem     Recheck Medication     Derm issue on abd. Has been there for over 2 years. Is getting worse. No pain, discharge or itchiness. Needs refill on Lexapro. Has been out for about 10 days. Is hoping to talk about increase.     Medication Reconciliation: complete    Marilou Wesley LPN

## 2020-07-06 NOTE — PROGRESS NOTES
SUBJECTIVE:   Mello Wilson is a 24 year old male here for evaluation of a skin rash.    HPI  Patient states in the last two years he has noticed that a small spot on his abdomen midline superior to his umbilicus has grown into and amorphous hyperpigmented spot with satellite lesions located on the sides of his abdomen as well.  Patient reports darkening of the spots with sun, the spots do not wax or wane, they have never been raised, flaky, red, itchy, or have any secretions.  He denies any constitutional symptoms such as change in weight, fevers, chills, night sweats, fatigue, headaches, or changes to bowel or bladder.    Allergies:  Allergies   Allergen Reactions     Dust Mite Extract      Other reaction(s): Angioedema, Erythema, Runny Nose  Itchy eyes     Nicotine GI Disturbance     No Clinical Screening - See Comments      Other reaction(s): Runny Nose  All Animal hair     Pollen Extract      Other reaction(s): Angioedema, Erythema, Runny Nose  Itchy eyes       Review of Systems   As above otherwise ROS is unremarkable.     OBJECTIVE:   /80 (BP Location: Right arm, Patient Position: Sitting, Cuff Size: Adult Regular)   Pulse 80   Temp 99.4  F (37.4  C) (Temporal)   Resp 16   Wt 62.7 kg (138 lb 3.2 oz)   SpO2 97%   BMI 19.27 kg/m      Physical Exam  General Appearance: Pleasant, alert, appropriate appearance for age and circumstances, no acute distress  Head: Normocephalic, atraumatic  Eyes: PERRL, EOMI  Skin: Multiple mildly hyperpigmented amorphous spots that seem to be overlapping located on the abdomen as well as several small spots along the axillary line.  No spots on the back.  Some spots also in the right groin area.  Spots range anywhere from 1 cm in diameter and roughly around to approximately 10 cm in diameter and amorphous.  The apots have no erythema, do not have any borders, are roughly homogenous in color without any significant black spots, and is not flaky, dry, weeping, or growing  hair.  Observation of the ophthalmoscope shows roughly similar epithelial skin when compared to the normal skin.  Neurologic Exam: CN 2-12 grossly intact.  Normal gait.  Symmetric DTRs, no focal motor or sensory deficits. No tremor.  Psychiatric Exam: Alert and oriented, appropriate affect    No results found for any visits on 07/06/20.    ASSESSMENT/PLAN:     1. Tinea versicolor        Suspect fungal infection    2% ketoconazole once a day for 2 to 3 weeks    Follow-up in the end of July if symptoms persist or do not improve    PARVEEN Law  [facility] AND [facility]    This document was prepared using voice generated software.  While every attempt was made for accuracy, grammatical errors may exist.

## 2020-08-05 DIAGNOSIS — F32.A ANXIETY AND DEPRESSION: ICD-10-CM

## 2020-08-05 DIAGNOSIS — F41.9 ANXIETY AND DEPRESSION: ICD-10-CM

## 2020-08-06 RX ORDER — ESCITALOPRAM OXALATE 10 MG/1
TABLET ORAL
Qty: 30 TABLET | Refills: 0 | Status: SHIPPED | OUTPATIENT
Start: 2020-08-06 | End: 2020-09-17

## 2020-08-06 NOTE — TELEPHONE ENCOUNTER
"[facility] sent Rx request for the following:   escitalopram (LEXAPRO) 10 MG tablet  Sig:  TAKE 1 TABLET(10 MG) BY MOUTH DAILY    Last Prescription Date:   6/25/2020  Last Fill Qty/Refills:         30, R-0    Last Office Visit:              7/6/2020   \"Return in about 4 weeks (around 5/21/2020) for Anxiety/Depression.\"  Future Office visit:           None    Will beatrice up med, add appt reminder note to Rx, and route to prescriber for consideration.    Carly Mo RN  ....................  8/6/2020   1:24 PM              "

## 2020-08-17 ENCOUNTER — TELEPHONE (OUTPATIENT)
Dept: FAMILY MEDICINE | Facility: OTHER | Age: 25
End: 2020-08-17

## 2020-08-18 NOTE — TELEPHONE ENCOUNTER
Left message for patient to call back. Need to ask PHQ questions . Kathleen Orellana LPN ....................8/18/2020  9:18 AM

## 2020-08-27 NOTE — TELEPHONE ENCOUNTER
Patient has an appointment on the [address]. Will close encounter and wait for patient to call back if anything further is needed. Kathleen Orellana LPN ....................8/27/2020  10:08 AM

## 2020-09-14 DIAGNOSIS — F32.A ANXIETY AND DEPRESSION: ICD-10-CM

## 2020-09-14 DIAGNOSIS — F41.9 ANXIETY AND DEPRESSION: ICD-10-CM

## 2020-09-16 NOTE — TELEPHONE ENCOUNTER
"[facility] sent Rx request for the following:      ESCITALOPRAM 10MG TABLETS   Sig: TAKE 1 TABLET(10 MG) BY MOUTH DAILY      Last Prescription Date:   8/6/2020  Last Fill Qty/Refills:         30, R-0    Last Office Visit:              7/6/2020   Future Office visit:           none    \"Return in about 4 weeks (around 5/21/2020) for Anxiety/Depression.\"       Will beatrice up med, add appt reminder note to Rx, and route to prescriber for consideration.    Tommie Summers RN, BSN  ....................  9/16/2020   10:25 AM        "

## 2020-09-17 RX ORDER — ESCITALOPRAM OXALATE 10 MG/1
TABLET ORAL
Qty: 30 TABLET | Refills: 0 | Status: SHIPPED | OUTPATIENT
Start: 2020-09-17 | End: 2021-01-14

## 2020-10-26 DIAGNOSIS — F32.A ANXIETY AND DEPRESSION: ICD-10-CM

## 2020-10-26 DIAGNOSIS — F41.9 ANXIETY AND DEPRESSION: ICD-10-CM

## 2020-10-27 NOTE — TELEPHONE ENCOUNTER
"[facility] sent Rx request for the following:   escitalopram (LEXAPRO) 10 MG tablet  Sig:TAKE 1 TABLET(10 MG) BY MOUTH DAILY    Last Prescription Date:   09/17/2020  Last Fill Qty/Refills:         30, R-0    Last Office Visit:              07/06/2020 (Eligio)   Future Office visit:           None noted    \"Return in about 4 weeks (around 5/21/2020) for Anxiety/Depression.\"    Call to patient to return call.   Unable to complete prescription refill per RN Medication Refill Policy.................... Felisa Fair RN ....................  10/27/2020   9:29 AM        "

## 2020-10-28 RX ORDER — ESCITALOPRAM OXALATE 10 MG/1
TABLET ORAL
Qty: 30 TABLET | Refills: 0 | OUTPATIENT
Start: 2020-10-28

## 2020-10-28 NOTE — TELEPHONE ENCOUNTER
Unable to reach patient. Request denied with notation made to pharmacy.   Unable to complete prescription refill per RN Medication Refill Policy.................... Felias Fair RN ....................  10/28/2020   12:29 PM

## 2020-12-23 DIAGNOSIS — F32.A ANXIETY AND DEPRESSION: ICD-10-CM

## 2020-12-23 DIAGNOSIS — F41.9 ANXIETY AND DEPRESSION: ICD-10-CM

## 2020-12-23 RX ORDER — ESCITALOPRAM OXALATE 10 MG/1
10 TABLET ORAL DAILY
Qty: 30 TABLET | Refills: 0 | OUTPATIENT
Start: 2020-12-23

## 2020-12-23 NOTE — TELEPHONE ENCOUNTER
See refill encounter, dated 9/14/20. Pt needs appointment. Refill request refused, with note to pharmacy. Unable to complete prescription refill per RN Medication Refill Policy. Catrachita Jacinto RN .............. 12/23/2020  12:26 PM

## 2021-01-13 ENCOUNTER — TELEPHONE (OUTPATIENT)
Dept: FAMILY MEDICINE | Facility: OTHER | Age: 26
End: 2021-01-13

## 2021-01-13 DIAGNOSIS — F32.A ANXIETY AND DEPRESSION: ICD-10-CM

## 2021-01-13 DIAGNOSIS — F41.9 ANXIETY AND DEPRESSION: ICD-10-CM

## 2021-01-13 NOTE — TELEPHONE ENCOUNTER
Looking for the medication refilled that was sent over a few time to [facility]. Patient has been out of the medication for a few months now.

## 2021-01-14 ENCOUNTER — OFFICE VISIT (OUTPATIENT)
Dept: FAMILY MEDICINE | Facility: OTHER | Age: 26
End: 2021-01-14
Attending: FAMILY MEDICINE
Payer: COMMERCIAL

## 2021-01-14 VITALS
DIASTOLIC BLOOD PRESSURE: 68 MMHG | RESPIRATION RATE: 16 BRPM | BODY MASS INDEX: 20.56 KG/M2 | WEIGHT: 143.6 LBS | TEMPERATURE: 98.4 F | SYSTOLIC BLOOD PRESSURE: 124 MMHG | HEART RATE: 80 BPM | HEIGHT: 70 IN | OXYGEN SATURATION: 97 %

## 2021-01-14 DIAGNOSIS — F32.A ANXIETY AND DEPRESSION: ICD-10-CM

## 2021-01-14 DIAGNOSIS — F41.9 ANXIETY AND DEPRESSION: ICD-10-CM

## 2021-01-14 DIAGNOSIS — J45.20 MILD INTERMITTENT ASTHMA WITHOUT COMPLICATION: Primary | ICD-10-CM

## 2021-01-14 PROCEDURE — 99213 OFFICE O/P EST LOW 20 MIN: CPT | Performed by: FAMILY MEDICINE

## 2021-01-14 PROCEDURE — G0463 HOSPITAL OUTPT CLINIC VISIT: HCPCS

## 2021-01-14 RX ORDER — ESCITALOPRAM OXALATE 10 MG/1
TABLET ORAL
Qty: 113 TABLET | Refills: 0 | Status: SHIPPED | OUTPATIENT
Start: 2021-01-14 | End: 2021-04-07

## 2021-01-14 RX ORDER — KETOCONAZOLE 20 MG/G
CREAM TOPICAL DAILY
Qty: 30 G | Refills: 0 | Status: CANCELLED | OUTPATIENT
Start: 2021-01-14

## 2021-01-14 RX ORDER — ALBUTEROL SULFATE 90 UG/1
2 AEROSOL, METERED RESPIRATORY (INHALATION) EVERY 4 HOURS PRN
Status: CANCELLED | OUTPATIENT
Start: 2021-01-14

## 2021-01-14 RX ORDER — ESCITALOPRAM OXALATE 10 MG/1
10 TABLET ORAL DAILY
Status: CANCELLED | OUTPATIENT
Start: 2021-01-14

## 2021-01-14 ASSESSMENT — ANXIETY QUESTIONNAIRES
6. BECOMING EASILY ANNOYED OR IRRITABLE: SEVERAL DAYS
5. BEING SO RESTLESS THAT IT IS HARD TO SIT STILL: SEVERAL DAYS
2. NOT BEING ABLE TO STOP OR CONTROL WORRYING: MORE THAN HALF THE DAYS
3. WORRYING TOO MUCH ABOUT DIFFERENT THINGS: MORE THAN HALF THE DAYS
7. FEELING AFRAID AS IF SOMETHING AWFUL MIGHT HAPPEN: MORE THAN HALF THE DAYS
GAD7 TOTAL SCORE: 12
IF YOU CHECKED OFF ANY PROBLEMS ON THIS QUESTIONNAIRE, HOW DIFFICULT HAVE THESE PROBLEMS MADE IT FOR YOU TO DO YOUR WORK, TAKE CARE OF THINGS AT HOME, OR GET ALONG WITH OTHER PEOPLE: VERY DIFFICULT
1. FEELING NERVOUS, ANXIOUS, OR ON EDGE: SEVERAL DAYS

## 2021-01-14 ASSESSMENT — PATIENT HEALTH QUESTIONNAIRE - PHQ9
SUM OF ALL RESPONSES TO PHQ QUESTIONS 1-9: 5
5. POOR APPETITE OR OVEREATING: NEARLY EVERY DAY

## 2021-01-14 ASSESSMENT — ENCOUNTER SYMPTOMS
FEVER: 0
CHILLS: 0

## 2021-01-14 ASSESSMENT — PAIN SCALES - GENERAL: PAINLEVEL: NO PAIN (0)

## 2021-01-14 ASSESSMENT — MIFFLIN-ST. JEOR: SCORE: 1647.62

## 2021-01-14 NOTE — TELEPHONE ENCOUNTER
Patient is over due for appointment for anxiety and depression.  He scheduled an appointment to be seen today.  Norma J. Gosselin, LPN .......  1/14/2021  8:18 AM

## 2021-01-14 NOTE — PROGRESS NOTES
Assessment & Plan     Anxiety and depression  PHQ-9 score of 5 and GUNJAN-7 score of 12, indicative of mild depression and moderate anxiety.  Symptoms previously helped by Escitalopram, though he struggled with taking the medication regularly and felt he would benefit from a higher dose.  Discussed importance of consistent use to derive full benefit of medication.  Discussed ways to improve his adherence with the medication regimen, and he believes taking it with his lunch while at work would help him most.  He will implement this change.  Restart Escitalopram at 10 mg daily for one week, then increase to 20 mg daily.  Follow-up in six weeks for reassessment or sooner if needed.  - escitalopram (LEXAPRO) 10 MG tablet; Take 1 tablet (10 mg) by mouth daily for one week.  Then take 2 tablets (20 mg) by mouth daily.    Mild intermittent asthma without complication  ACT score of 23, indicative of well-controlled asthma.  Continue albuterol inhaler as needed.  Follow-up if frequency of use increasing.    Review of the result(s) of each unique test - PHQ-9, GUNJAN-7, ACT      Tiffanie Salas DO  [facility] CL[facility] AND hospitals    Fabiola Sarkar is a 25 year old who presents to clinic today for the following health issues:  HPI   Depression, Anxiety:  Reports that he has had some difficulty remembering to take his medication consistently.  He missed his follow-up appointment after starting the medication back in April and has been out of medication for the past month.  He had no complications or adverse effects while taking the medication.  He felt that it helped his symptoms but thinks he would benefit from a higher dose.  Specifically he was able to concentrate better, felt more even-keeled, and was able to hold conversations without worrying.  Overall, he felt that his mental processes were more clear.    Asthma:  Has albuterol inhaler at home, which he rarely needs to use.  Symptoms are most prevalent at work, when  "heavily exerting himself.  He states that he is \"able to catch my breath after a little while\" and then feels fine.  He denies shortness of breath or cough on a daily basis.  Symptoms have improved since moving out of his parents house where he was exposed to secondhand smoke.    Review of Systems   Constitutional: Negative for chills and fever.   Cardiovascular: Negative for chest pain.          Objective    /68   Pulse 80   Temp 98.4  F (36.9  C) (Temporal)   Resp 16   Ht 1.778 m (5' 10\")   Wt 65.1 kg (143 lb 9.6 oz)   SpO2 97%   BMI 20.60 kg/m    Body mass index is 20.6 kg/m .  Physical Exam  Constitutional:       General: He is not in acute distress.     Appearance: Normal appearance. He is not ill-appearing.   Cardiovascular:      Rate and Rhythm: Normal rate and regular rhythm.      Heart sounds: No murmur. No friction rub. No gallop.    Pulmonary:      Effort: Pulmonary effort is normal.      Breath sounds: Normal breath sounds. No wheezing, rhonchi or rales.   Neurological:      Mental Status: He is alert.   Psychiatric:         Mood and Affect: Mood normal.        "

## 2021-01-14 NOTE — NURSING NOTE
"Chief Complaint   Patient presents with     Mental Health Problem     depression and anxiety         Initial /68   Pulse 80   Temp 98.4  F (36.9  C) (Temporal)   Resp 16   Ht 1.778 m (5' 10\")   Wt 65.1 kg (143 lb 9.6 oz)   SpO2 97%   BMI 20.60 kg/m   Estimated body mass index is 20.6 kg/m  as calculated from the following:    Height as of this encounter: 1.778 m (5' 10\").    Weight as of this encounter: 65.1 kg (143 lb 9.6 oz).         Medication Reconciliation: Complete      Norma J. Gosselin, LPN   "

## 2021-01-15 ASSESSMENT — ASTHMA QUESTIONNAIRES: ACT_TOTALSCORE: 23

## 2021-01-15 ASSESSMENT — ANXIETY QUESTIONNAIRES: GAD7 TOTAL SCORE: 12

## 2021-03-08 ENCOUNTER — OFFICE VISIT (OUTPATIENT)
Dept: FAMILY MEDICINE | Facility: OTHER | Age: 26
End: 2021-03-08
Attending: FAMILY MEDICINE
Payer: COMMERCIAL

## 2021-03-08 VITALS
RESPIRATION RATE: 16 BRPM | HEART RATE: 91 BPM | OXYGEN SATURATION: 98 % | BODY MASS INDEX: 21.02 KG/M2 | WEIGHT: 146.8 LBS | HEIGHT: 70 IN | DIASTOLIC BLOOD PRESSURE: 70 MMHG | TEMPERATURE: 98.5 F | SYSTOLIC BLOOD PRESSURE: 110 MMHG

## 2021-03-08 DIAGNOSIS — F41.9 ANXIETY AND DEPRESSION: Primary | ICD-10-CM

## 2021-03-08 DIAGNOSIS — F32.A ANXIETY AND DEPRESSION: Primary | ICD-10-CM

## 2021-03-08 PROCEDURE — 99212 OFFICE O/P EST SF 10 MIN: CPT | Performed by: FAMILY MEDICINE

## 2021-03-08 PROCEDURE — G0463 HOSPITAL OUTPT CLINIC VISIT: HCPCS

## 2021-03-08 RX ORDER — BUPROPION HYDROCHLORIDE 150 MG/1
150 TABLET ORAL EVERY MORNING
Qty: 60 TABLET | Refills: 0 | Status: SHIPPED | OUTPATIENT
Start: 2021-03-08 | End: 2021-05-13

## 2021-03-08 ASSESSMENT — ANXIETY QUESTIONNAIRES
2. NOT BEING ABLE TO STOP OR CONTROL WORRYING: SEVERAL DAYS
6. BECOMING EASILY ANNOYED OR IRRITABLE: MORE THAN HALF THE DAYS
7. FEELING AFRAID AS IF SOMETHING AWFUL MIGHT HAPPEN: SEVERAL DAYS
GAD7 TOTAL SCORE: 8
1. FEELING NERVOUS, ANXIOUS, OR ON EDGE: SEVERAL DAYS
5. BEING SO RESTLESS THAT IT IS HARD TO SIT STILL: SEVERAL DAYS
IF YOU CHECKED OFF ANY PROBLEMS ON THIS QUESTIONNAIRE, HOW DIFFICULT HAVE THESE PROBLEMS MADE IT FOR YOU TO DO YOUR WORK, TAKE CARE OF THINGS AT HOME, OR GET ALONG WITH OTHER PEOPLE: SOMEWHAT DIFFICULT
3. WORRYING TOO MUCH ABOUT DIFFERENT THINGS: SEVERAL DAYS

## 2021-03-08 ASSESSMENT — MIFFLIN-ST. JEOR: SCORE: 1662.13

## 2021-03-08 ASSESSMENT — PAIN SCALES - GENERAL: PAINLEVEL: NO PAIN (0)

## 2021-03-08 ASSESSMENT — ENCOUNTER SYMPTOMS
CHILLS: 0
FEVER: 0

## 2021-03-08 ASSESSMENT — PATIENT HEALTH QUESTIONNAIRE - PHQ9
5. POOR APPETITE OR OVEREATING: SEVERAL DAYS
SUM OF ALL RESPONSES TO PHQ QUESTIONS 1-9: 10

## 2021-03-08 NOTE — PROGRESS NOTES
"Assessment & Plan     Anxiety and depression  Symptoms incompletely controlled on Escitalopram 20 mg daily despite consistent daily use.  Discussed weaning medication and trying alternative therapy versus addition of Bupropion as an adjunct medication, and he would prefer the latter.  Continue Escitalopram 20 mg daily.  Start Bupropion 150 mg daily.  Counseled on potential adverse effects and written information provided.  Follow-up in 4-6 weeks for reassessment or sooner if needed.  - buPROPion (WELLBUTRIN XL) 150 MG 24 hr tablet; Take 1 tablet (150 mg) by mouth every morning    13 minutes spent on the date of the encounter doing chart review, history and exam, documentation and further activities as noted above     Depression Screening Follow Up    PHQ 3/8/2021   PHQ-9 Total Score 10   Q9: Thoughts of better off dead/self-harm past 2 weeks Not at all     Tiffanie Salas,   [facility] [place] [facility]    Fabiola Sarkar is a 25 year old who presents for the following health issues:    HPI   Anxiety, Depression:  He reports that he feels \"a little better,\" though he continues to experience symptoms of depression and anxiety.  Control over his depression is worse than that over his anxiety.  Reports low energy and loss of interest in things he used to enjoy.  He has also been more irritable and agitated.  His relationship is a source of stress, as he and the mother of his child are not getting along and having difficulty agreeing.  He feels like the medication is working somewhat, but states that she has told him its not working \"good enough.\"    Review of Systems   Constitutional: Negative for chills and fever.          Objective    /70   Pulse 91   Temp 98.5  F (36.9  C) (Tympanic)   Resp 16   Ht 1.778 m (5' 10\")   Wt 66.6 kg (146 lb 12.8 oz)   SpO2 98%   BMI 21.06 kg/m    Body mass index is 21.06 kg/m .  Physical Exam  Constitutional:       General: He is not in acute distress.     " Appearance: Normal appearance. He is not ill-appearing.   Pulmonary:      Effort: Pulmonary effort is normal.   Neurological:      Mental Status: He is alert.   Psychiatric:         Mood and Affect: Mood normal.

## 2021-03-08 NOTE — PATIENT INSTRUCTIONS
Serotonin syndrome symptoms usually occur within several hours of taking a new drug or increasing the dose of a drug you're already taking.    Signs and symptoms include:    Agitation or restlessness  Confusion  Rapid heart rate and high blood pressure  Dilated pupils  Loss of muscle coordination or twitching muscles  Muscle rigidity  Heavy sweating  Diarrhea  Headache  Shivering  Goose bumps    Severe serotonin syndrome can be life-threatening. Signs include:    High fever  Seizures  Irregular heartbeat  Unconsciousness

## 2021-03-08 NOTE — NURSING NOTE
"Chief Complaint   Patient presents with     Mental Health Problem     depression and anxiety         Initial /70   Pulse 91   Temp 98.5  F (36.9  C) (Tympanic)   Resp 16   Ht 1.778 m (5' 10\")   Wt 66.6 kg (146 lb 12.8 oz)   SpO2 98%   BMI 21.06 kg/m   Estimated body mass index is 21.06 kg/m  as calculated from the following:    Height as of this encounter: 1.778 m (5' 10\").    Weight as of this encounter: 66.6 kg (146 lb 12.8 oz).         Medication Reconciliation: Complete      Norma J. Gosselin, LPN   "

## 2021-03-09 ASSESSMENT — ANXIETY QUESTIONNAIRES: GAD7 TOTAL SCORE: 8

## 2021-03-09 ASSESSMENT — ASTHMA QUESTIONNAIRES: ACT_TOTALSCORE: 25

## 2021-03-29 ENCOUNTER — TELEPHONE (OUTPATIENT)
Dept: FAMILY MEDICINE | Facility: OTHER | Age: 26
End: 2021-03-29

## 2021-03-29 ENCOUNTER — ALLIED HEALTH/NURSE VISIT (OUTPATIENT)
Dept: FAMILY MEDICINE | Facility: OTHER | Age: 26
End: 2021-03-29
Attending: FAMILY MEDICINE
Payer: COMMERCIAL

## 2021-03-29 DIAGNOSIS — J02.9 SORE THROAT: Primary | ICD-10-CM

## 2021-03-29 PROCEDURE — 87635 SARS-COV-2 COVID-19 AMP PRB: CPT | Mod: ZL | Performed by: FAMILY MEDICINE

## 2021-03-29 PROCEDURE — C9803 HOPD COVID-19 SPEC COLLECT: HCPCS

## 2021-03-29 NOTE — TELEPHONE ENCOUNTER
Patient calling in regards to covid testing. Stated his significant other is sick and is getting tested. patient Is asymptomatic. Advised patient if she comes back positive or if he develops symptoms to call back and he would meet criteria to be tested. Patient verbalized understanding.

## 2021-03-30 LAB
LABORATORY COMMENT REPORT: NORMAL
SARS-COV-2 RNA RESP QL NAA+PROBE: NEGATIVE
SARS-COV-2 RNA RESP QL NAA+PROBE: NORMAL
SPECIMEN SOURCE: NORMAL
SPECIMEN SOURCE: NORMAL

## 2021-04-07 DIAGNOSIS — F41.9 ANXIETY AND DEPRESSION: Primary | ICD-10-CM

## 2021-04-07 DIAGNOSIS — F32.A ANXIETY AND DEPRESSION: Primary | ICD-10-CM

## 2021-04-07 RX ORDER — ESCITALOPRAM OXALATE 20 MG/1
20 TABLET ORAL DAILY
Qty: 30 TABLET | Refills: 0 | Status: SHIPPED | OUTPATIENT
Start: 2021-04-07 | End: 2021-05-13

## 2021-04-07 NOTE — TELEPHONE ENCOUNTER
[facility] sent Rx request for the following:      Requested Prescriptions   Pending Prescriptions Disp Refills   escitalopram (LEXAPRO) 10 MG tablet [Pharmacy Med Name: ESCITALOPRAM 10MG TABLETS] 113 tablet 0    Sig: TAKE 1 TABLET(10 MG) BY MOUTH DAILY FOR 1 WEEK THEN TAKE 2 TABLETS(20 MG) BY MOUTH DAILY   Last Prescription Date:   1/14/21  Last Fill Qty/Refills:         113, R-0    Last Office Visit:              3/8/21  Future Office visit:           None  Routing refill request to provider for review/approval because:   SSRIs Protocol Failed - 4/7/2021  9:28 AM       Failed - PHQ-9 score less than 5 in past 6 months     Per LOV note, Pt was instructed to follow up 4-6 weeks later for reassessment; between 4/5 and 4/19. Routing to PCP, for consideration of 90-day refill. Unable to complete prescription refill per RN Medication Refill Policy. Catrachita Jacinto RN .............. 4/7/2021  9:54 AM

## 2021-04-24 ENCOUNTER — HEALTH MAINTENANCE LETTER (OUTPATIENT)
Age: 26
End: 2021-04-24

## 2021-05-04 DIAGNOSIS — F32.A ANXIETY AND DEPRESSION: ICD-10-CM

## 2021-05-04 DIAGNOSIS — F41.9 ANXIETY AND DEPRESSION: ICD-10-CM

## 2021-05-05 ENCOUNTER — E-VISIT (OUTPATIENT)
Dept: URGENT CARE | Facility: URGENT CARE | Age: 26
End: 2021-05-05
Payer: COMMERCIAL

## 2021-05-05 ENCOUNTER — ALLIED HEALTH/NURSE VISIT (OUTPATIENT)
Dept: FAMILY MEDICINE | Facility: OTHER | Age: 26
End: 2021-05-05
Attending: FAMILY MEDICINE
Payer: COMMERCIAL

## 2021-05-05 DIAGNOSIS — R06.02 SHORTNESS OF BREATH: ICD-10-CM

## 2021-05-05 DIAGNOSIS — R05.9 COUGH: Primary | ICD-10-CM

## 2021-05-05 DIAGNOSIS — R51.9 SEVERE HEADACHE: Primary | ICD-10-CM

## 2021-05-05 LAB
SARS-COV-2 RNA RESP QL NAA+PROBE: NORMAL
SPECIMEN SOURCE: NORMAL

## 2021-05-05 PROCEDURE — 99421 OL DIG E/M SVC 5-10 MIN: CPT | Performed by: PHYSICIAN ASSISTANT

## 2021-05-05 PROCEDURE — C9803 HOPD COVID-19 SPEC COLLECT: HCPCS

## 2021-05-05 PROCEDURE — U0005 INFEC AGEN DETEC AMPLI PROBE: HCPCS | Mod: ZL | Performed by: FAMILY MEDICINE

## 2021-05-05 PROCEDURE — U0003 INFECTIOUS AGENT DETECTION BY NUCLEIC ACID (DNA OR RNA); SEVERE ACUTE RESPIRATORY SYNDROME CORONAVIRUS 2 (SARS-COV-2) (CORONAVIRUS DISEASE [COVID-19]), AMPLIFIED PROBE TECHNIQUE, MAKING USE OF HIGH THROUGHPUT TECHNOLOGIES AS DESCRIBED BY CMS-2020-01-R: HCPCS | Mod: ZL | Performed by: FAMILY MEDICINE

## 2021-05-05 RX ORDER — ESCITALOPRAM OXALATE 20 MG/1
TABLET ORAL
Qty: 30 TABLET | Refills: 0 | OUTPATIENT
Start: 2021-05-05

## 2021-05-05 NOTE — PATIENT INSTRUCTIONS
Dear Mello Wilson,    We are sorry you are not feeling well. Based on the responses you provided, you may be experiencing a serious health condition that needs immediate in-person attention. It is recommended that you be seen in the emergency room in order to better evaluate your symptoms. Please click here to find the nearest Emergency Room.     Your severe headache and shortness of breath need to be assessed urgently.    Tabatha Coughlin PA-C

## 2021-05-05 NOTE — TELEPHONE ENCOUNTER
[facility] sent Rx request for the following:      Requested Prescriptions   Pending Prescriptions Disp Refills   escitalopram (LEXAPRO) 20 MG tablet [Pharmacy Med Name: ESCITALOPRAM 20MG TABLETS] 30 tablet 0    Sig: TAKE 1 TABLET(20 MG) BY MOUTH DAILY   Last Prescription Date:   4/7/21  Last Fill Qty/Refills:         30, R-0    Last Office Visit:              3/8/21   Future Office visit:           None     SSRIs Protocol Failed - 5/5/2021  8:26 AM       Failed - PHQ-9 score less than 5 in past 6 months     Per 4/7 refill encounter, Dr. Salas noted Pt needs an office visit for further refills. Unable to complete prescription refill per RN Medication Refill Policy. Catrachita Jacinto RN .............. 5/5/2021  8:32 AM

## 2021-05-06 LAB
LABORATORY COMMENT REPORT: NORMAL
SARS-COV-2 RNA RESP QL NAA+PROBE: NEGATIVE
SPECIMEN SOURCE: NORMAL

## 2021-05-13 ENCOUNTER — OFFICE VISIT (OUTPATIENT)
Dept: FAMILY MEDICINE | Facility: OTHER | Age: 26
End: 2021-05-13
Attending: FAMILY MEDICINE
Payer: COMMERCIAL

## 2021-05-13 VITALS
OXYGEN SATURATION: 96 % | HEART RATE: 94 BPM | SYSTOLIC BLOOD PRESSURE: 120 MMHG | TEMPERATURE: 99.7 F | DIASTOLIC BLOOD PRESSURE: 68 MMHG | HEIGHT: 70 IN | WEIGHT: 142.2 LBS | RESPIRATION RATE: 16 BRPM | BODY MASS INDEX: 20.36 KG/M2

## 2021-05-13 DIAGNOSIS — F41.9 ANXIETY AND DEPRESSION: ICD-10-CM

## 2021-05-13 DIAGNOSIS — F32.A ANXIETY AND DEPRESSION: ICD-10-CM

## 2021-05-13 PROCEDURE — 99213 OFFICE O/P EST LOW 20 MIN: CPT | Performed by: FAMILY MEDICINE

## 2021-05-13 PROCEDURE — G0463 HOSPITAL OUTPT CLINIC VISIT: HCPCS

## 2021-05-13 RX ORDER — ESCITALOPRAM OXALATE 20 MG/1
20 TABLET ORAL DAILY
Qty: 90 TABLET | Refills: 4 | Status: SHIPPED | OUTPATIENT
Start: 2021-05-13 | End: 2022-05-31

## 2021-05-13 ASSESSMENT — PATIENT HEALTH QUESTIONNAIRE - PHQ9
SUM OF ALL RESPONSES TO PHQ QUESTIONS 1-9: 2
5. POOR APPETITE OR OVEREATING: SEVERAL DAYS

## 2021-05-13 ASSESSMENT — ANXIETY QUESTIONNAIRES
5. BEING SO RESTLESS THAT IT IS HARD TO SIT STILL: SEVERAL DAYS
IF YOU CHECKED OFF ANY PROBLEMS ON THIS QUESTIONNAIRE, HOW DIFFICULT HAVE THESE PROBLEMS MADE IT FOR YOU TO DO YOUR WORK, TAKE CARE OF THINGS AT HOME, OR GET ALONG WITH OTHER PEOPLE: SOMEWHAT DIFFICULT
GAD7 TOTAL SCORE: 7
6. BECOMING EASILY ANNOYED OR IRRITABLE: SEVERAL DAYS
1. FEELING NERVOUS, ANXIOUS, OR ON EDGE: SEVERAL DAYS
2. NOT BEING ABLE TO STOP OR CONTROL WORRYING: SEVERAL DAYS
7. FEELING AFRAID AS IF SOMETHING AWFUL MIGHT HAPPEN: SEVERAL DAYS
3. WORRYING TOO MUCH ABOUT DIFFERENT THINGS: SEVERAL DAYS

## 2021-05-13 ASSESSMENT — MIFFLIN-ST. JEOR: SCORE: 1641.26

## 2021-05-13 ASSESSMENT — PAIN SCALES - GENERAL: PAINLEVEL: NO PAIN (0)

## 2021-05-13 NOTE — NURSING NOTE
"Chief Complaint   Patient presents with     Recheck Medication         Initial /68   Pulse 94   Temp 99.7  F (37.6  C) (Temporal)   Resp 16   Ht 1.778 m (5' 10\")   Wt 64.5 kg (142 lb 3.2 oz)   SpO2 96%   BMI 20.40 kg/m   Estimated body mass index is 20.4 kg/m  as calculated from the following:    Height as of this encounter: 1.778 m (5' 10\").    Weight as of this encounter: 64.5 kg (142 lb 3.2 oz).         Medication Reconciliation: Complete      Norma J. Gosselin, LPN   "

## 2021-05-13 NOTE — PROGRESS NOTES
"Assessment & Plan     Anxiety and depression  He is content with his symptom control on the medication and has had no adverse effects.  Continue Escitalopram 20 mg daily.  Refill provided.  Discussed follow-up if symptoms worsening.  - escitalopram (LEXAPRO) 20 MG tablet; Take 1 tablet (20 mg) by mouth daily    DO ITALO Tadeo [facility]    Fabiola Sarkar is a 25 year old who presents for the following health issues:    HPI     Depression, Anxiety:  He started the Bupropion but \"felt funny\" on the medication and so stopped taking it.  However, he feels that he has been doing better.  He states that he is able to \"think a lot clearer\" than previously.  He still has some anxiety but feels that he is better equipped to deal with it on the medication than without.  He notices a difference if he misses doses of the Escitalopram.  He has tolerated it without complications or adverse effects.  Overall he is content with current symptom control.    Review of Systems   Psychiatric/Behavioral: Negative for suicidal ideas.            Objective    /68   Pulse 94   Temp 99.7  F (37.6  C) (Temporal)   Resp 16   Ht 1.778 m (5' 10\")   Wt 64.5 kg (142 lb 3.2 oz)   SpO2 96%   BMI 20.40 kg/m    Body mass index is 20.4 kg/m .  Physical Exam  Constitutional:       General: He is not in acute distress.     Appearance: Normal appearance. He is not ill-appearing.   Pulmonary:      Effort: Pulmonary effort is normal.   Neurological:      Mental Status: He is alert.   Psychiatric:         Mood and Affect: Mood normal.              "

## 2021-05-14 ASSESSMENT — ANXIETY QUESTIONNAIRES: GAD7 TOTAL SCORE: 7

## 2021-05-28 ENCOUNTER — ALLIED HEALTH/NURSE VISIT (OUTPATIENT)
Dept: FAMILY MEDICINE | Facility: OTHER | Age: 26
End: 2021-05-28
Attending: FAMILY MEDICINE
Payer: COMMERCIAL

## 2021-05-28 DIAGNOSIS — R50.9 FEVER: Primary | ICD-10-CM

## 2021-05-28 LAB
SARS-COV-2 RNA RESP QL NAA+PROBE: NORMAL
SPECIMEN SOURCE: NORMAL

## 2021-05-28 PROCEDURE — C9803 HOPD COVID-19 SPEC COLLECT: HCPCS

## 2021-05-28 PROCEDURE — U0005 INFEC AGEN DETEC AMPLI PROBE: HCPCS | Mod: ZL | Performed by: FAMILY MEDICINE

## 2021-05-28 PROCEDURE — U0003 INFECTIOUS AGENT DETECTION BY NUCLEIC ACID (DNA OR RNA); SEVERE ACUTE RESPIRATORY SYNDROME CORONAVIRUS 2 (SARS-COV-2) (CORONAVIRUS DISEASE [COVID-19]), AMPLIFIED PROBE TECHNIQUE, MAKING USE OF HIGH THROUGHPUT TECHNOLOGIES AS DESCRIBED BY CMS-2020-01-R: HCPCS | Mod: ZL | Performed by: FAMILY MEDICINE

## 2021-10-03 ENCOUNTER — HEALTH MAINTENANCE LETTER (OUTPATIENT)
Age: 26
End: 2021-10-03

## 2022-05-15 ENCOUNTER — HEALTH MAINTENANCE LETTER (OUTPATIENT)
Age: 27
End: 2022-05-15

## 2022-05-27 DIAGNOSIS — F32.A ANXIETY AND DEPRESSION: ICD-10-CM

## 2022-05-27 DIAGNOSIS — F41.9 ANXIETY AND DEPRESSION: ICD-10-CM

## 2022-05-31 NOTE — TELEPHONE ENCOUNTER
"[facility] sent Rx request for the following:   escitalopram (LEXAPRO) 20 MG tablet  SigTAKE 1 TABLET(20 MG) BY MOUTH DAILY    Last Prescription Date:   05/13/2021  Last Fill Qty/Refills:         90, R-4    Last Office Visit:              05/13/2021 (Josue)   Future Office visit:           None noted   SSRIs Protocol Failed - 5/27/2022  4:06 AM        Failed - PHQ-9 score less than 5 in past 6 months     Please review last PHQ-9 score.           Failed - Recent (6 mo) or future (30 days) visit within the authorizing provider's specialty     Patient had office visit in the last 6 months or has a visit in the next 30 days with authorizing provider or within the authorizing provider's specialty.  See \"Patient Info\" tab in inbasket, or \"Choose Columns\" in Meds & Orders section of the refill encounter.            Passed - Medication is active on med list        Passed - Patient is age 18 or older         Patient overdue for annual review. Unable to complete prescription refill per RN Medication Refill Policy.................... Felisa Vargas RN ....................  5/31/2022   11:14 AM        "

## 2022-06-01 RX ORDER — ESCITALOPRAM OXALATE 20 MG/1
TABLET ORAL
Qty: 90 TABLET | Refills: 0 | Status: SHIPPED | OUTPATIENT
Start: 2022-06-01 | End: 2022-09-12

## 2022-06-02 NOTE — TELEPHONE ENCOUNTER
Patient scheduled for med check with Dr. Salas 6/6/22.    Emerald Escobar on 6/2/2022 at 11:14 AM

## 2022-08-04 DIAGNOSIS — F41.9 ANXIETY AND DEPRESSION: ICD-10-CM

## 2022-08-04 DIAGNOSIS — F32.A ANXIETY AND DEPRESSION: ICD-10-CM

## 2022-08-04 RX ORDER — ESCITALOPRAM OXALATE 20 MG/1
TABLET ORAL
Qty: 90 TABLET | Refills: 0 | OUTPATIENT
Start: 2022-08-04

## 2022-08-04 NOTE — TELEPHONE ENCOUNTER
[facility] sent Rx request for the following:      ESCITALOPRAM 20MG TABLETS      Last Prescription Date:   6/1/2022  Last Fill Qty/Refills:         90, R-0    Last Office Visit:              5/13/2021   Future Office visit:           none    Tommie Summers RN, BSN  ....................  8/4/2022   11:27 AM

## 2022-08-04 NOTE — TELEPHONE ENCOUNTER
Patient RYC to nurse in unit 4.   I seen his name on the RX auth folder for needing an appt so I relayed the message and scheduled him 8/15/ 10:40am    Krystal Doyle on 8/4/2022 at 12:52 PM

## 2022-09-08 DIAGNOSIS — F32.A ANXIETY AND DEPRESSION: ICD-10-CM

## 2022-09-08 DIAGNOSIS — F41.9 ANXIETY AND DEPRESSION: ICD-10-CM

## 2022-09-10 ENCOUNTER — HEALTH MAINTENANCE LETTER (OUTPATIENT)
Age: 27
End: 2022-09-10

## 2022-09-12 RX ORDER — ESCITALOPRAM OXALATE 20 MG/1
TABLET ORAL
Qty: 90 TABLET | Refills: 4 | Status: SHIPPED | OUTPATIENT
Start: 2022-09-12

## 2022-09-12 NOTE — TELEPHONE ENCOUNTER
[facility] sent Rx request for the following:      ESCITALOPRAM 20MG TABLETS      Last Prescription Date:   6/1/2022  Last Fill Qty/Refills:         90, R-0    Last Office Visit:              5/13/2021   Future Office visit:           9/21/2022    Tommie Summers RN, BSN  ....................  9/12/2022   10:07 AM

## 2022-12-01 ENCOUNTER — HOSPITAL ENCOUNTER (EMERGENCY)
Facility: OTHER | Age: 27
Discharge: HOME | End: 2022-12-01
Attending: STUDENT IN AN ORGANIZED HEALTH CARE EDUCATION/TRAINING PROGRAM | Admitting: STUDENT IN AN ORGANIZED HEALTH CARE EDUCATION/TRAINING PROGRAM
Payer: COMMERCIAL

## 2022-12-01 ENCOUNTER — APPOINTMENT (OUTPATIENT)
Dept: GENERAL RADIOLOGY | Facility: OTHER | Age: 27
End: 2022-12-01
Attending: STUDENT IN AN ORGANIZED HEALTH CARE EDUCATION/TRAINING PROGRAM
Payer: COMMERCIAL

## 2022-12-01 VITALS
OXYGEN SATURATION: 98 % | HEIGHT: 71 IN | WEIGHT: 145 LBS | HEART RATE: 93 BPM | TEMPERATURE: 97.9 F | SYSTOLIC BLOOD PRESSURE: 105 MMHG | DIASTOLIC BLOOD PRESSURE: 63 MMHG | RESPIRATION RATE: 16 BRPM | BODY MASS INDEX: 20.3 KG/M2

## 2022-12-01 DIAGNOSIS — S63.631A SPRAIN OF INTERPHALANGEAL JOINT OF LEFT INDEX FINGER, INITIAL ENCOUNTER: ICD-10-CM

## 2022-12-01 PROCEDURE — 99207 PR NO CHARGE LOS: CPT | Performed by: STUDENT IN AN ORGANIZED HEALTH CARE EDUCATION/TRAINING PROGRAM

## 2022-12-01 PROCEDURE — 73140 X-RAY EXAM OF FINGER(S): CPT | Mod: LT

## 2022-12-01 PROCEDURE — 29130 APPL FINGER SPLINT STATIC: CPT | Mod: F1 | Performed by: STUDENT IN AN ORGANIZED HEALTH CARE EDUCATION/TRAINING PROGRAM

## 2022-12-01 PROCEDURE — 99283 EMERGENCY DEPT VISIT LOW MDM: CPT | Performed by: STUDENT IN AN ORGANIZED HEALTH CARE EDUCATION/TRAINING PROGRAM

## 2022-12-01 PROCEDURE — 250N000013 HC RX MED GY IP 250 OP 250 PS 637: Performed by: STUDENT IN AN ORGANIZED HEALTH CARE EDUCATION/TRAINING PROGRAM

## 2022-12-01 RX ORDER — ACETAMINOPHEN 325 MG/1
650 TABLET ORAL ONCE
Status: COMPLETED | OUTPATIENT
Start: 2022-12-01 | End: 2022-12-01

## 2022-12-01 RX ORDER — IBUPROFEN 400 MG/1
800 TABLET, FILM COATED ORAL ONCE
Status: COMPLETED | OUTPATIENT
Start: 2022-12-01 | End: 2022-12-01

## 2022-12-01 RX ADMIN — ACETAMINOPHEN 650 MG: 325 TABLET ORAL at 11:41

## 2022-12-01 RX ADMIN — IBUPROFEN 800 MG: 400 TABLET, FILM COATED ORAL at 11:41

## 2022-12-01 ASSESSMENT — ACTIVITIES OF DAILY LIVING (ADL): ADLS_ACUITY_SCORE: 33

## 2022-12-01 NOTE — ED NOTES
Provider applied splint to pt's finger prior to discharge.    Shanell Muñoz RN on 12/1/2022 at 12:21 PM

## 2022-12-01 NOTE — ED PROVIDER NOTES
Emergency Department Provider Note  : 1995 Age: 26 year old Sex: male MRN: [id number]    Chief Complaint   Patient presents with     Hand Pain       Medical Decision Making / Assessment / Plan   26 year old male with no contributory past medical history who presents with acute onset left upper extremity third digit pain after throwing firewood.  No obvious injury the patient is aware of.  He is acting like he has subluxation or partial dislocation of the PIP of that digit but has had some focal pain over the DIP as well.  There is some circumferential mild edema.  Minimal amount of paresthesias.  Otherwise CMS intact both Limited extension to within 20 degrees of 180.  X-ray pending.    ED Course as of 22 1208   Thu Dec 01, 2022   1205 X-ray unremarkable without evidence of bony injury or dislocation.  Obviously this is representative Vennemann soft tissue injury.  Will treat as such with splinting for symptomatic management, icing and NSAIDs.  We will follow-up with sports med if symptoms persistent.        The patient was informed of the plan and verbalized understanding and agreed with the plan. The patient was given strict return to Emergency Department precautions as well as appropriate follow up instructions. The patient was discharged in stable condition.    New Prescriptions    No medications on file       Final diagnoses:   Sprain of interphalangeal joint of left index finger, initial encounter       Homar Valdes MD  2022   Emergency Department    Fabiola Sarkar is a 26 year old male with no contributory PMH who presents at 11:00 AM for evaluation of acute onset left upper extremity third digit pain after throwing firewood.  No obvious injury the patient is aware of.  He is acting like he has subluxation or partial dislocation of the PIP of that digit but has had some focal pain over the DIP as well.  There is some circumferential mild edema.  Minimal amount of paresthesias. No  "other symptoms or complaints. No medications taken prior to coming in.    I have reviewed the Medications, Allergies, Past Medical and Surgical History, and Social History in the Epic System and with family.    Review of Systems:  Please see HPI for pertinent positives and negatives. All other systems reviewed and found to be negative.      Objective   BP: 105/63  Pulse: 93  Temp: 97.9  F (36.6  C)  Resp: 16  Height: 180.3 cm (5' 11\")  Weight: 65.8 kg (145 lb)  SpO2: 98 %    Physical Exam:   Gen: Comfortable. NAD  HEENT: MMM. AT/NC.  Eye: EOMI.   CV:  Good cap refill distal aspect of the left upper extremity third digit.  Pulm: Nonlabored, equal chest rise  Abd: ND.   Ext:  Mild circumferential edema without obvious contusion of the left upper extremity third digit from the PIP distally.   limited ability to extend the finger to roughly 160 degrees.  He has pain with flexion and extension of the DIP as well with focal tenderness palpation over the middle phalanx diffusely.  No other injuries or tenderness palpation throughout the left hand  Neuro:  Motor and sensation grossly intact left upper extremity third digit other than described paresthesias.  Psych: Appropriate affect, cognition intact    Procedures / Critical Care   Procedures    Critical Care Time: none         Medical/Surgical History:  Past Medical History:   Diagnosis Date     Attention-deficit hyperactivity disorder, combined type     2003,Goals:  [place] issues and not breaking toys.     Child in welfare custody     Eligible for [employer] [facility].     Past Surgical History:   Procedure Laterality Date     OTHER SURGICAL HISTORY      2002,[id number],OTHER, Dr. Vaughn.     OTHER SURGICAL HISTORY      [id number],OTHER,left foot surgery after stepped on needle.       Medications:  No current facility-administered medications for this encounter.     Current Outpatient Medications   Medication     albuterol (PROAIR " HFA/PROVENTIL HFA/VENTOLIN HFA) 108 (90 BASE) MCG/ACT Inhaler     escitalopram (LEXAPRO) 20 MG tablet       Allergies:  Dust mite extract, Nicotine, No clinical screening - see comments, and Pollen extract    Relevant labs, images, EKGs, Epic and outside hospital (if applicable) charts were reviewed. The findings, diagnosis, plan, and need for follow up were discussed with the patient/family. Nursing notes were reviewed.

## 2022-12-01 NOTE — Clinical Note
Mello Wilson was seen and treated in our emergency department on 12/1/2022.  He may return to work on 12/05/2022.       If you have any questions or concerns, please don't hesitate to call.      Homar Valdes MD

## 2022-12-01 NOTE — ED TRIAGE NOTES
Pt ws working at home and injured his 3rd finger of left hand. Swollen, bruising.    Shanell Muñoz RN on 12/1/2022 at 10:58 AM       Triage Assessment     Row Name 12/01/22 1057       Triage Assessment (Adult)    Airway WDL WDL       Skin Circulation/Temperature WDL    Skin Circulation/Temperature WDL X  3rd left digit swollen       Peripheral/Neurovascular WDL    Peripheral Neurovascular WDL WDL       Cognitive/Neuro/Behavioral WDL    Cognitive/Neuro/Behavioral WDL WDL

## 2022-12-12 ENCOUNTER — HOSPITAL ENCOUNTER (EMERGENCY)
Facility: OTHER | Age: 27
Discharge: HOME | End: 2022-12-12
Payer: COMMERCIAL

## 2022-12-12 VITALS
OXYGEN SATURATION: 99 % | DIASTOLIC BLOOD PRESSURE: 68 MMHG | BODY MASS INDEX: 20.3 KG/M2 | SYSTOLIC BLOOD PRESSURE: 132 MMHG | RESPIRATION RATE: 16 BRPM | HEIGHT: 71 IN | HEART RATE: 80 BPM | TEMPERATURE: 98.3 F | WEIGHT: 145 LBS

## 2022-12-13 NOTE — ED TRIAGE NOTES
Pt here by himself, pt reports left eye pain that started this afternoon and has been getting worse, VSS, pt reports that a piece of siding may have hit him in the eye today at work, eye is watery and pink and painful, pt out into waiting room to wait for Ed room     Triage Assessment     Row Name 12/12/22 2030       Triage Assessment (Adult)    Airway WDL WDL       Respiratory WDL    Respiratory WDL WDL       Skin Circulation/Temperature WDL    Skin Circulation/Temperature WDL WDL       Cardiac WDL    Cardiac WDL WDL       Peripheral/Neurovascular WDL    Peripheral Neurovascular WDL WDL       Cognitive/Neuro/Behavioral WDL    Cognitive/Neuro/Behavioral WDL WDL

## 2023-02-15 ENCOUNTER — HOSPITAL ENCOUNTER (EMERGENCY)
Facility: OTHER | Age: 28
Discharge: HOME | End: 2023-02-15
Attending: FAMILY MEDICINE | Admitting: FAMILY MEDICINE
Payer: COMMERCIAL

## 2023-02-15 VITALS
HEART RATE: 96 BPM | HEIGHT: 71 IN | BODY MASS INDEX: 20.3 KG/M2 | SYSTOLIC BLOOD PRESSURE: 126 MMHG | RESPIRATION RATE: 16 BRPM | TEMPERATURE: 97.9 F | DIASTOLIC BLOOD PRESSURE: 65 MMHG | OXYGEN SATURATION: 98 % | WEIGHT: 145 LBS

## 2023-02-15 DIAGNOSIS — J06.9 VIRAL URI: ICD-10-CM

## 2023-02-15 DIAGNOSIS — J02.0 STREPTOCOCCAL PHARYNGITIS: Primary | ICD-10-CM

## 2023-02-15 LAB
FLUAV RNA SPEC QL NAA+PROBE: NEGATIVE
FLUBV RNA RESP QL NAA+PROBE: NEGATIVE
GROUP A STREP BY PCR: DETECTED
RSV RNA SPEC NAA+PROBE: NEGATIVE
SARS-COV-2 RNA RESP QL NAA+PROBE: NEGATIVE

## 2023-02-15 PROCEDURE — 99283 EMERGENCY DEPT VISIT LOW MDM: CPT | Mod: CS | Performed by: FAMILY MEDICINE

## 2023-02-15 PROCEDURE — C9803 HOPD COVID-19 SPEC COLLECT: HCPCS | Performed by: FAMILY MEDICINE

## 2023-02-15 PROCEDURE — 87637 SARSCOV2&INF A&B&RSV AMP PRB: CPT | Performed by: STUDENT IN AN ORGANIZED HEALTH CARE EDUCATION/TRAINING PROGRAM

## 2023-02-15 PROCEDURE — 87651 STREP A DNA AMP PROBE: CPT | Performed by: FAMILY MEDICINE

## 2023-02-15 RX ORDER — PENICILLIN V POTASSIUM 500 MG/1
500 TABLET, FILM COATED ORAL 3 TIMES DAILY
Qty: 30 TABLET | Refills: 0 | Status: SHIPPED | OUTPATIENT
Start: 2023-02-15 | End: 2023-02-25

## 2023-02-15 ASSESSMENT — ENCOUNTER SYMPTOMS
CHEST TIGHTNESS: 0
ACTIVITY CHANGE: 1
APPETITE CHANGE: 1
SORE THROAT: 1
APNEA: 0
SINUS PAIN: 1
STRIDOR: 0
LIGHT-HEADEDNESS: 1
CHOKING: 0
FEVER: 1
WHEEZING: 0
SHORTNESS OF BREATH: 0
COUGH: 0
RHINORRHEA: 0

## 2023-02-15 NOTE — Clinical Note
Mello Wilson was seen and treated in our emergency department on 2/15/2023.  He may return to work on 02/20/2023.       If you have any questions or concerns, please don't hesitate to call.      Adrienne Cruz, DO

## 2023-02-15 NOTE — ED PROVIDER NOTES
History     Chief Complaint   Patient presents with     Fever     Generalized Body Aches     Pharyngitis     HPI  Mello Wilson is a 27 year old male who presents with fever and sore throat.  Gets strep throat about twice per year.  No nausea or vomiting.  Poor p.o. appetite.  Thinks he had a fever over the weekend but did not take temperature.  Having some chills.  Difficulty swallowing due to pain.  No difficulty breathing.  No cough or shortness of breath or wheezing.  No recent travel.  +sick contacts. No ear pain or drainage.  Also complains of some nasal congestion.  No significant lymphadenopathy.    Allergies:  Allergies   Allergen Reactions     Dust Mite Extract      Other reaction(s): Angioedema, Erythema, Runny Nose  Itchy eyes     Nicotine GI Disturbance     No Clinical Screening - See Comments      Other reaction(s): Runny Nose  All Animal hair     Pollen Extract      Other reaction(s): Angioedema, Erythema, Runny Nose  Itchy eyes       Problem List:    Patient Active Problem List    Diagnosis Date Noted     Acute midline low back pain without sciatica -- Approximately L5 04/27/2018     Priority: Medium     Allergic rhinitis due to pollen 02/13/2018     Priority: Medium     Overview:   spring and fall       Amblyopia 02/13/2018     Priority: Medium     Overview:   Dense (congenital)       Anxiety state 02/13/2018     Priority: Medium     Overview:   Features of agitation and possible dysthymia       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     Priority: Medium     Ingrown toenail 08/04/2012     Priority: Medium     Insomnia 08/16/2011     Priority: Medium     Asthma 05/04/2011     Priority: Medium     Overview:   No Asthma Action Plan developed       Elbow, forearm, and wrist, abrasion or friction burn, without mention of infection 04/29/2011     Priority: Medium        Past Medical History:    Past Medical History:   Diagnosis Date     Attention-deficit hyperactivity disorder, combined type      Child in  "welfare custody        Past Surgical History:    Past Surgical History:   Procedure Laterality Date     OTHER SURGICAL HISTORY      ,[id number],OTHER, Dr. Vaughn.     OTHER SURGICAL HISTORY      [id number],OTHER,left foot surgery after stepped on needle.       Family History:    Family History   Problem Relation Age of Onset     Other - See Comments Sister          shunt     Diabetes Maternal Grandmother         Diabetes     Other - See Comments Paternal Grandmother          after being on ventilator was a smoker       Social History:  Marital Status:  Single [1]  Social History     Tobacco Use     Smoking status: Former     Packs/day: 0.25     Years: 2.00     Pack years: 0.50     Types: Cigarettes     Smokeless tobacco: Current     Types: Chew   Substance Use Topics     Alcohol use: Yes     Alcohol/week: 0.0 standard drinks     Comment: occasionally     Drug use: No        Medications:    penicillin V (VEETID) 500 MG tablet  albuterol (PROAIR HFA/PROVENTIL HFA/VENTOLIN HFA) 108 (90 BASE) MCG/ACT Inhaler  escitalopram (LEXAPRO) 20 MG tablet          Review of Systems   Constitutional: Positive for activity change, appetite change and fever.   HENT: Positive for congestion, sinus pain and sore throat. Negative for ear discharge, ear pain and rhinorrhea.    Respiratory: Negative for apnea, cough, choking, chest tightness, shortness of breath, wheezing and stridor.    Cardiovascular: Negative for chest pain.   Skin: Negative for rash.   Neurological: Positive for light-headedness.       Physical Exam   BP: 126/65  Pulse: 96  Temp: 97.9  F (36.6  C)  Resp: 16  Height: 180.3 cm (5' 11\")  Weight: 65.8 kg (145 lb)  SpO2: 98 %      Physical Exam  Vitals and nursing note reviewed.   Constitutional:       General: He is not in acute distress.     Appearance: He is well-developed. He is not diaphoretic.   HENT:      Head: Normocephalic and atraumatic.      Nose: Congestion present.      Mouth/Throat:      Mouth: Mucous " membranes are moist.      Pharynx: Oropharyngeal exudate and posterior oropharyngeal erythema present. No pharyngeal swelling.      Tonsils: No tonsillar exudate or tonsillar abscesses. 2+ on the right. 2+ on the left.   Eyes:      General: No scleral icterus.     Conjunctiva/sclera: Conjunctivae normal.      Pupils: Pupils are equal, round, and reactive to light.   Cardiovascular:      Rate and Rhythm: Normal rate and regular rhythm.      Heart sounds: Normal heart sounds.   Pulmonary:      Effort: Pulmonary effort is normal. No respiratory distress.      Breath sounds: Normal breath sounds. No stridor. No wheezing, rhonchi or rales.   Abdominal:      Tenderness: There is no abdominal tenderness.   Musculoskeletal:         General: No tenderness.      Cervical back: Neck supple.   Lymphadenopathy:      Cervical: Cervical adenopathy present.   Skin:     General: Skin is warm and dry.      Findings: No rash.   Neurological:      Mental Status: He is alert.         ED Course                 Procedures              Critical Care time:  none               Results for orders placed or performed during the hospital encounter of 02/15/23 (from the past 24 hour(s))   Group A Streptococcus PCR Throat Swab    Specimen: Throat; Swab   Result Value Ref Range    Group A strep by PCR Detected (A) Not Detected    Narrative    The Xpert Xpress Strep A test, performed on the [employer], is a rapid, qualitative in vitro diagnostic test for the detection of Streptococcus pyogenes (Group A ß-hemolytic Streptococcus, Strep A) in throat swab specimens from patients with signs and symptoms of pharyngitis. The Xpert Xpress Strep A test can be used as an aid in the diagnosis of Group A Streptococcal pharyngitis. The assay is not intended to monitor treatment for Group A Streptococcus infections. The Xpert Xpress Strep A test utilizes an automated real-time polymerase chain reaction (PCR) to detect Streptococcus pyogenes  DNA.       Medications - No data to display    Assessments & Plan (with Medical Decision Making)     I have reviewed the nursing notes.    I have reviewed the findings, diagnosis, plan and need for follow up with the patient.       Medical Decision Making  The patient's presentation is strongly suggestive of a clearly self-limited or minor problem.    The patient's evaluation involved:  history and exam without other MDM data elements    The patient's management involved only low risk treatment.        Discharge Medication List as of 2/15/2023 10:28 AM          Final diagnoses:   Viral URI   Streptococcal pharyngitis   Symptomatic care discussed.  He did not want a wait for his COVID test to come back.  If it is positive I will call him at home.  If strep is positive I will also call in an antibiotic and call him at home.  Patient understands and agrees with the plan.    2/15/2023   [facility]     Adrienne Cruz, DO  02/15/23 1025       Adrienne Cruz, DO  02/15/23 1033    Strep test positive. PCN sent in to pharmacy.        Adrienne Cruz, DO  02/15/23 4099

## 2023-02-15 NOTE — ED TRIAGE NOTES
Pt here by himself with c/o sore throat, body aches and not feeling well since Sunday, VSS, pt reports that he needs a work note, pt into [facility] ambulatory, pt took tylenol this AM     Triage Assessment     Row Name 02/15/23 1005       Triage Assessment (Adult)    Airway WDL WDL       Respiratory WDL    Respiratory WDL WDL       Skin Circulation/Temperature WDL    Skin Circulation/Temperature WDL WDL       Cardiac WDL    Cardiac WDL WDL       Peripheral/Neurovascular WDL    Peripheral Neurovascular WDL WDL       Cognitive/Neuro/Behavioral WDL    Cognitive/Neuro/Behavioral WDL WDL

## 2023-06-03 ENCOUNTER — HEALTH MAINTENANCE LETTER (OUTPATIENT)
Age: 28
End: 2023-06-03

## 2023-08-02 ENCOUNTER — OFFICE VISIT (OUTPATIENT)
Dept: FAMILY MEDICINE | Facility: OTHER | Age: 28
End: 2023-08-02
Attending: STUDENT IN AN ORGANIZED HEALTH CARE EDUCATION/TRAINING PROGRAM
Payer: COMMERCIAL

## 2023-08-02 VITALS
HEART RATE: 92 BPM | WEIGHT: 145.13 LBS | HEIGHT: 71 IN | BODY MASS INDEX: 20.32 KG/M2 | RESPIRATION RATE: 18 BRPM | DIASTOLIC BLOOD PRESSURE: 64 MMHG | OXYGEN SATURATION: 98 % | SYSTOLIC BLOOD PRESSURE: 112 MMHG | TEMPERATURE: 96.8 F

## 2023-08-02 DIAGNOSIS — F41.1 GAD (GENERALIZED ANXIETY DISORDER): Primary | ICD-10-CM

## 2023-08-02 DIAGNOSIS — F33.1 MODERATE EPISODE OF RECURRENT MAJOR DEPRESSIVE DISORDER (H): ICD-10-CM

## 2023-08-02 PROCEDURE — 99214 OFFICE O/P EST MOD 30 MIN: CPT | Performed by: STUDENT IN AN ORGANIZED HEALTH CARE EDUCATION/TRAINING PROGRAM

## 2023-08-02 PROCEDURE — G0463 HOSPITAL OUTPT CLINIC VISIT: HCPCS

## 2023-08-02 RX ORDER — FLUOXETINE 10 MG/1
10 CAPSULE ORAL DAILY
Qty: 30 CAPSULE | Refills: 3 | Status: SHIPPED | OUTPATIENT
Start: 2023-08-02

## 2023-08-02 ASSESSMENT — ANXIETY QUESTIONNAIRES
6. BECOMING EASILY ANNOYED OR IRRITABLE: MORE THAN HALF THE DAYS
4. TROUBLE RELAXING: MORE THAN HALF THE DAYS
7. FEELING AFRAID AS IF SOMETHING AWFUL MIGHT HAPPEN: MORE THAN HALF THE DAYS
2. NOT BEING ABLE TO STOP OR CONTROL WORRYING: MORE THAN HALF THE DAYS
5. BEING SO RESTLESS THAT IT IS HARD TO SIT STILL: MORE THAN HALF THE DAYS
IF YOU CHECKED OFF ANY PROBLEMS ON THIS QUESTIONNAIRE, HOW DIFFICULT HAVE THESE PROBLEMS MADE IT FOR YOU TO DO YOUR WORK, TAKE CARE OF THINGS AT HOME, OR GET ALONG WITH OTHER PEOPLE: VERY DIFFICULT
3. WORRYING TOO MUCH ABOUT DIFFERENT THINGS: MORE THAN HALF THE DAYS
GAD7 TOTAL SCORE: 14
1. FEELING NERVOUS, ANXIOUS, OR ON EDGE: MORE THAN HALF THE DAYS
GAD7 TOTAL SCORE: 14

## 2023-08-02 ASSESSMENT — ASTHMA QUESTIONNAIRES: ACT_TOTALSCORE: 20

## 2023-08-02 ASSESSMENT — PATIENT HEALTH QUESTIONNAIRE - PHQ9
SUM OF ALL RESPONSES TO PHQ QUESTIONS 1-9: 14
SUM OF ALL RESPONSES TO PHQ QUESTIONS 1-9: 14
10. IF YOU CHECKED OFF ANY PROBLEMS, HOW DIFFICULT HAVE THESE PROBLEMS MADE IT FOR YOU TO DO YOUR WORK, TAKE CARE OF THINGS AT HOME, OR GET ALONG WITH OTHER PEOPLE: VERY DIFFICULT

## 2023-08-02 ASSESSMENT — PAIN SCALES - GENERAL: PAINLEVEL: NO PAIN (0)

## 2023-08-02 ASSESSMENT — COLUMBIA-SUICIDE SEVERITY RATING SCALE - C-SSRS
2. IN THE PAST MONTH, HAVE YOU ACTUALLY HAD ANY THOUGHTS OF KILLING YOURSELF?: NO
6. HAVE YOU EVER DONE ANYTHING, STARTED TO DO ANYTHING, OR PREPARED TO DO ANYTHING TO END YOUR LIFE?: NO
1. WITHIN THE PAST MONTH, HAVE YOU WISHED YOU WERE DEAD OR WISHED YOU COULD GO TO SLEEP AND NOT WAKE UP?: NO

## 2023-08-02 NOTE — PROGRESS NOTES
Assessment & Plan     GUNJAN (generalized anxiety disorder)  Moderate episode of recurrent major depressive disorder (H)  Not well controlled. Discussed medication options and is interested in trial prozac. Reviewed benefits vs risks and side effects of medication and patient in agreement to start taking.  Follow up in 2 weeks   - FLUoxetine (PROZAC) 10 MG capsule; Take 1 capsule (10 mg) by mouth daily             Depression Screening Follow Up        8/2/2023     2:16 PM   PHQ   PHQ-9 Total Score 14   Q9: Thoughts of better off dead/self-harm past 2 weeks Several days   F/U: Thoughts of suicide or self-harm No   F/U: Safety concerns No         8/2/2023     2:16 PM   Last PHQ-9   1.  Little interest or pleasure in doing things 2   2.  Feeling down, depressed, or hopeless 1   3.  Trouble falling or staying asleep, or sleeping too much 2   4.  Feeling tired or having little energy 1   5.  Poor appetite or overeating 2   6.  Feeling bad about yourself 2   7.  Trouble concentrating 2   8.  Moving slowly or restless 1   Q9: Thoughts of better off dead/self-harm past 2 weeks 1   PHQ-9 Total Score 14   In the past two weeks have you had thoughts of suicide or self harm? No   Do you have concerns about your personal safety or the safety of others? No             8/2/2023     3:29 PM   C-SSRS (Brief [place])   Within the last month, have you wished you were dead or wished you could go to sleep and not wake up? No   Within the last month, have you had any actual thoughts of killing yourself? No   Within the last month, have you ever done anything, started to do anything, or prepared to do anything to end your life? No       Follow Up        Follow Up Actions Taken  Crisis resource information provided in the After Visit Summary        No follow-ups on file.    Virgilio Saha MD  [facility]   Mello is a 27 year old, presenting for the following health issues:  Medication Follow-up  "(Medication management for depression and anxiety)      History of Present Illness       Mental Health Follow-up:  Patient presents to follow-up on Depression & Anxiety.Patient's depression since last visit has been:  Worse  The patient is not having other symptoms associated with depression.  Patient's anxiety since last visit has been:  Worse  The patient is not having other symptoms associated with anxiety.  Any significant life events: relationship concerns and financial concerns  Patient is feeling anxious or having panic attacks.  Patient has no concerns about alcohol or drug use.    He eats 0-1 servings of fruits and vegetables daily.He consumes 4 sweetened beverage(s) daily.He exercises with enough effort to increase his heart rate 30 to 60 minutes per day.  He exercises with enough effort to increase his heart rate 5 days per week. He is missing 1 dose(s) of medications per week.       Anxiety and depression   - has been chronic issues  - hard to focus  - hard to relax  - previously prescribed lexapro, did not help (did as a teen)   - not interested in therapy at this time  - would like to start meds  - no current SI, has had passive SI in the past           Review of Systems   Constitutional, HEENT, cardiovascular, pulmonary, gi and gu systems are negative, except as otherwise noted.      Objective    /64 (BP Location: Right arm, Patient Position: Sitting, Cuff Size: Adult Regular)   Pulse 92   Temp 96.8  F (36  C) (Tympanic)   Resp 18   Ht 1.803 m (5' 11\")   Wt 65.8 kg (145 lb 2 oz)   SpO2 98%   BMI 20.24 kg/m    Body mass index is 20.24 kg/m .  Physical Exam   GENERAL: healthy, alert and no distress  NEURO: Normal strength and tone, mentation intact and speech normal  PSYCH: mentation appears normal, affect normal/bright                      "

## 2023-08-02 NOTE — PATIENT INSTRUCTIONS
Mental Health Providers    [facility] ([facility]): [phone], multiple providers for therapy, diagnostic assessments with Dr. Dandy Loco, Dr. Gretchen Stiles  [facility]: [phone], multiple providers for therapy, diagnostic assessments, medication management, [facility]/shelter  [facility]: [phone], multiple providers for therapy, diagnostic assessments  [facility] counseling [phone]  [facility]: [phone], multiple providers for therapy  [facility] [phone]  [facility]: [phone],or [phone] Zabrina Nettles, therapy for children, adolescents   and adults  [facility] [phone], [email or web address]  [facility]: [phone], multiple providers for child, adolescent and adult therapy services, med management  [phone], Pippa Man, therapy for children, adolescents and adults  [facility]: [phone], multiple providers for therapy for adolescents and adults  [facility]: [phone], counseling for children, adults and family  Betina Florezmary jo:[phone], counseling for adults and adolescents with anxiety, depression, grief, EMDR and relationship issues  Leonardo Del Valle:[phone], individual counseling, diagnostic assessments  [facility]: [phone], Iban Champagne, [facility], ages 5 and up, medication management, family therapy  [phone], alcohol and drug counseling  [facility]: [phone], individual and family counseling, medication management  [facility]: [phone], chemical dependency for adolescents and adults, inpatient and outpatient programs  [facility]: [phone]: individual counseling for adults and adolescents 13 and up.  [facility]: [phone], Megan INTERIANO, counseling,  diagnostic assessments, medication management  [facility]: [phone], emphasis on evaluation/diagnostic services as well as individual, family and couple counseling   Laura Villalobos [phone]      Out of Area  [facility] [phone]  [place] [facility] [phone]  [facility] [phone]  As of 7/2019    CRISIS RESPONSE TEAM (CRT)/FIRST CALL FOR HELP  [phone] OR [phone]    [facility]  [phone]    Crisis Text Line  http://www.crisistextline.org  The Crisis Text Line serves anyone, in any type of crisis, providing access free, 24/7 support and information.  Text HOME to [phone] from anywhere in the [place]

## 2023-08-02 NOTE — NURSING NOTE
Patient presents to clinic for follow up with medication management for diepression and anxiety.  He states he is having trouble sleeping and his mind wonders during a conversation.    Medication Rec Complete  Catrachita Echavarria LPN............8/2/2023 2:32 PM

## 2025-05-11 ENCOUNTER — HEALTH MAINTENANCE LETTER (OUTPATIENT)
Age: 30
End: 2025-05-11

## 2025-06-11 ENCOUNTER — HOSPITAL ENCOUNTER (EMERGENCY)
Facility: OTHER | Age: 30
Discharge: HOME | End: 2025-06-11
Attending: FAMILY MEDICINE
Payer: OTHER MISCELLANEOUS

## 2025-06-11 VITALS
BODY MASS INDEX: 21.7 KG/M2 | SYSTOLIC BLOOD PRESSURE: 119 MMHG | TEMPERATURE: 98.3 F | WEIGHT: 155 LBS | RESPIRATION RATE: 16 BRPM | DIASTOLIC BLOOD PRESSURE: 74 MMHG | HEART RATE: 76 BPM | HEIGHT: 71 IN | OXYGEN SATURATION: 99 %

## 2025-06-11 DIAGNOSIS — T15.01XA FOREIGN BODY OF RIGHT CORNEA, INITIAL ENCOUNTER: ICD-10-CM

## 2025-06-11 PROCEDURE — 99207 PR NO CHARGE LOS: CPT | Performed by: FAMILY MEDICINE

## 2025-06-11 PROCEDURE — 90715 TDAP VACCINE 7 YRS/> IM: CPT | Performed by: FAMILY MEDICINE

## 2025-06-11 PROCEDURE — 65220 REMOVE FOREIGN BODY FROM EYE: CPT | Mod: RT | Performed by: FAMILY MEDICINE

## 2025-06-11 PROCEDURE — 99284 EMERGENCY DEPT VISIT MOD MDM: CPT | Mod: 25 | Performed by: FAMILY MEDICINE

## 2025-06-11 PROCEDURE — 90471 IMMUNIZATION ADMIN: CPT | Performed by: FAMILY MEDICINE

## 2025-06-11 PROCEDURE — 250N000011 HC RX IP 250 OP 636: Performed by: FAMILY MEDICINE

## 2025-06-11 PROCEDURE — 250N000009 HC RX 250: Performed by: FAMILY MEDICINE

## 2025-06-11 RX ORDER — KETOROLAC TROMETHAMINE 4 MG/ML
1 SOLUTION/ DROPS OPHTHALMIC 4 TIMES DAILY
Qty: 5 ML | Refills: 0 | Status: SHIPPED | OUTPATIENT
Start: 2025-06-11 | End: 2025-06-16

## 2025-06-11 RX ORDER — BACITRACIN 500 [USP'U]/G
0.5 OINTMENT OPHTHALMIC 3 TIMES DAILY
Qty: 3.5 G | Refills: 0 | Status: SHIPPED | OUTPATIENT
Start: 2025-06-11 | End: 2025-06-16

## 2025-06-11 RX ORDER — TETRACAINE HYDROCHLORIDE 5 MG/ML
1-2 SOLUTION OPHTHALMIC ONCE
Status: COMPLETED | OUTPATIENT
Start: 2025-06-11 | End: 2025-06-11

## 2025-06-11 RX ADMIN — CLOSTRIDIUM TETANI TOXOID ANTIGEN (FORMALDEHYDE INACTIVATED), CORYNEBACTERIUM DIPHTHERIAE TOXOID ANTIGEN (FORMALDEHYDE INACTIVATED), BORDETELLA PERTUSSIS TOXOID ANTIGEN (GLUTARALDEHYDE INACTIVATED), BORDETELLA PERTUSSIS FILAMENTOUS HEMAGGLUTININ ANTIGEN (FORMALDEHYDE INACTIVATED), BORDETELLA PERTUSSIS PERTACTIN ANTIGEN, AND BORDETELLA PERTUSSIS FIMBRIAE 2/3 ANTIGEN 0.5 ML: 5; 2; 2.5; 5; 3; 5 INJECTION, SUSPENSION INTRAMUSCULAR at 13:54

## 2025-06-11 RX ADMIN — TETRACAINE HYDROCHLORIDE 2 DROP: 5 SOLUTION OPHTHALMIC at 11:32

## 2025-06-11 RX ADMIN — FLUORESCEIN SODIUM 1 STRIP: 1 STRIP OPHTHALMIC at 11:33

## 2025-06-11 ASSESSMENT — COLUMBIA-SUICIDE SEVERITY RATING SCALE - C-SSRS
1. IN THE PAST MONTH, HAVE YOU WISHED YOU WERE DEAD OR WISHED YOU COULD GO TO SLEEP AND NOT WAKE UP?: NO
2. HAVE YOU ACTUALLY HAD ANY THOUGHTS OF KILLING YOURSELF IN THE PAST MONTH?: NO
6. HAVE YOU EVER DONE ANYTHING, STARTED TO DO ANYTHING, OR PREPARED TO DO ANYTHING TO END YOUR LIFE?: NO

## 2025-06-11 ASSESSMENT — ACTIVITIES OF DAILY LIVING (ADL)
ADLS_ACUITY_SCORE: 41

## 2025-06-11 ASSESSMENT — ENCOUNTER SYMPTOMS: PHOTOPHOBIA: 1

## 2025-06-11 NOTE — ED TRIAGE NOTES
"ED Nursing Triage Note (General)   ________________________________    Mello AVA Wilson is a 29 year old Male that presents to triage private car  with history of  foreign body in the right eye reported by patient . Symptoms started last night. Interventions prior to arrival include attempt to rinse with eye wash station. Patient was sweeping and got something in his eye and he thinks its a paint chip since he saw some thing in his eye this morning  /74   Pulse 76   Temp 98.3  F (36.8  C) (Temporal)   Resp 16   Ht 1.803 m (5' 11\")   Wt 70.3 kg (155 lb)   SpO2 99%   BMI 21.62 kg/m  t  Patient appears alert  and oriented    GCS Total = 15    Action taken:  roomed             Triage Assessment (Adult)       Row Name 06/11/25 1120          Triage Assessment    Airway WDL WDL        Respiratory WDL    Respiratory WDL WDL        Skin Circulation/Temperature WDL    Skin Circulation/Temperature WDL WDL        Cardiac WDL    Cardiac WDL WDL        Peripheral/Neurovascular WDL    Peripheral Neurovascular WDL WDL        Cognitive/Neuro/Behavioral WDL    Cognitive/Neuro/Behavioral WDL WDL                     "

## 2025-06-11 NOTE — Clinical Note
Mello Wilson was seen and treated in our emergency department on 6/11/2025.  He may return to work on 06/13/2025.       If you have any questions or concerns, please don't hesitate to call.      Jose A Diop MD

## 2025-06-11 NOTE — DISCHARGE INSTRUCTIONS
I am reasonably confident that the foreign body was removed but there is potential that some is still retained.  Also very superficial corneal injury where the foreign body was.  Ketorolac eyedrops are for pain control and antibiotics are for corneal lubrication as well as prevention of infection.  Recommend follow-up with an eye doctor in 24 to 48 hours.  Follow-up sooner or see the emergency department with uncontrolled pain, severe light sensitivity, increasing thick drainage or fever.  Sincerely   Dr Diop

## 2025-06-11 NOTE — ED PROVIDER NOTES
History     Chief Complaint   Patient presents with    Foreign Body in Eye     right     HPI  Mello Wilson is a 29 year old male who presents to the ED a day after he feeling like he got something in his eye at work.  Was flushed at home did not really seem to change things, light sensitivity, slight foreign body sensation but he can actually see a annia of white in his eye.    Reviewed RN notes below, same history relayed to me  Mello Wilson is a 29 year old Male that presents to triage private car  with history of  foreign body in the right eye reported by patient . Symptoms started last night. Interventions prior to arrival include attempt to rinse with eye wash station. Patient was sweeping and got something in his eye and he thinks its a paint chip since he saw some thing in his eye this morning     Allergies:  Allergies   Allergen Reactions    Dust Mite Extract      Other reaction(s): Angioedema, Erythema, Runny Nose  Itchy eyes    Nicotine GI Disturbance    No Clinical Screening - See Comments      Other reaction(s): Runny Nose  All Animal hair    Pollen Extract      Other reaction(s): Angioedema, Erythema, Runny Nose  Itchy eyes       Problem List:    Patient Active Problem List    Diagnosis Date Noted    Acute midline low back pain without sciatica -- Approximately L5 04/27/2018     Priority: Medium    Allergic rhinitis due to pollen 02/13/2018     Priority: Medium     Overview:   spring and fall      Amblyopia 02/13/2018     Priority: Medium     Overview:   Dense (congenital)      Anxiety state 02/13/2018     Priority: Medium     Overview:   Features of agitation and possible dysthymia      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     Priority: Medium    Ingrown toenail 08/04/2012     Priority: Medium    Insomnia 08/16/2011     Priority: Medium    Asthma 05/04/2011     Priority: Medium     Overview:   No Asthma Action Plan developed      Elbow, forearm, and wrist, abrasion or friction burn, without mention of  "infection 2011     Priority: Medium        Past Medical History:    Past Medical History:   Diagnosis Date    Attention-deficit hyperactivity disorder, combined type     Child in welfare custody        Past Surgical History:    Past Surgical History:   Procedure Laterality Date    OTHER SURGICAL HISTORY      ,,OTHER, Dr. Vaughn.    OTHER SURGICAL HISTORY      [id number],OTHER,left foot surgery after stepped on needle.       Family History:    Family History   Problem Relation Age of Onset    Other - See Comments Sister          shunt    Diabetes Maternal Grandmother         Diabetes    Other - See Comments Paternal Grandmother          after being on ventilator was a smoker       Social History:  Marital Status:  Single [1]  Social History     Tobacco Use    Smoking status: Former     Current packs/day: 0.25     Average packs/day: 0.3 packs/day for 2.0 years (0.5 ttl pk-yrs)     Types: Cigarettes    Smokeless tobacco: Current     Types: Chew   Vaping Use    Vaping status: Never Used   Substance Use Topics    Alcohol use: Yes     Alcohol/week: 0.0 standard drinks of alcohol     Comment: occasionally    Drug use: No        Medications:    bacitracin 500 UNIT/GM ophthalmic ointment  ketorolac tromethamine (ACULAR-LS) 0.4 % SOLN ophthalmic solution  albuterol (PROAIR HFA/PROVENTIL HFA/VENTOLIN HFA) 108 (90 BASE) MCG/ACT Inhaler  escitalopram (LEXAPRO) 20 MG tablet  FLUoxetine (PROZAC) 10 MG capsule          Review of Systems   Eyes:  Positive for photophobia. Negative for visual disturbance.       Physical Exam   BP: 119/74  Pulse: 76  Temp: 98.3  F (36.8  C)  Resp: 16  Height: 180.3 cm (5' 11\")  Weight: 70.3 kg (155 lb)  SpO2: 99 %      Physical Exam  Vitals and nursing note reviewed.   Constitutional:       General: He is not in acute distress.  Eyes:      General:         Right eye: Foreign body present.      Conjunctiva/sclera:      Right eye: Right conjunctiva is injected.      Pupils:      Right " "eye: Corneal abrasion and fluorescein uptake present.      Slit lamp exam:     Right eye: Anterior chamber quiet.   Cardiovascular:      Rate and Rhythm: Normal rate and regular rhythm.       Patient has known baseline left strabismus  Right eye conjunctival injection, obvious white tiny foreign body on his cornea.  Right upper lid flipped no foreign body.  Foreign body removed with a Q-tip, afterwards further slit-lamp examination showed very superficial fluorescein staining defect in the area of the foreign body    No results found for this or any previous visit (from the past 24 hours).    Medications   tetracaine (PONTOCAINE) 0.5 % ophthalmic solution 1-2 drop (2 drops Right Eye $Given 6/11/25 1132)   fluorescein (FUL-NACHO) ophthalmic strip 1 strip (1 strip Right Eye $Given by Other 6/11/25 1823)   Tdap (tetanus-diphtheria-acell pertussis) (ADACEL) injection 0.5 mL (0.5 mLs Intramuscular $Given 6/11/25 1354)       Assessments & Plan (with Medical Decision Making)     I have reviewed the nursing notes.    I have reviewed the findings, diagnosis, plan and need for follow up with the patient.    superficial corneal epithelial defect, nonetheless we will treat as corneal abrasion.  I do believe all of foreign bodies are removed.  Recommend very close follow-up with ophthalmology    \"I am reasonably confident that the foreign body was removed but there is potential that some is still retained.  Also very superficial corneal injury where the foreign body was.  Ketorolac eyedrops are for pain control and antibiotics are for corneal lubrication as well as prevention of infection.  Recommend follow-up with an eye doctor in 24 to 48 hours.  Follow-up sooner or see the emergency department with uncontrolled pain, severe light sensitivity, increasing thick drainage or fever.  Sincerely   Dr Diop\"    New Prescriptions    BACITRACIN 500 UNIT/GM OPHTHALMIC OINTMENT    Place 0.5 inches into the right eye 3 times daily for 5 " days.    KETOROLAC TROMETHAMINE (ACULAR-LS) 0.4 % SOLN OPHTHALMIC SOLUTION    Place 1 drop into the right eye 4 times daily for 5 days.       Final diagnoses:   Foreign body of right cornea, initial encounter       6/11/2025   [facility] Center[facility] A MD  06/11/25 [id number]

## 2025-06-11 NOTE — LETTER
June 11, 2025      To Whom It May Concern:      Mello Wilson was seen and treated in our emergency department on 6/11/2025.  He may return to work on 06/12/2025.       If you have any questions or concerns, please don't hesitate to call.      Sincerely,  Jose A Diop MD

## (undated) RX ORDER — TETRACAINE HYDROCHLORIDE 5 MG/ML
SOLUTION OPHTHALMIC
Status: DISPENSED
Start: 2025-06-11

## (undated) RX ORDER — IBUPROFEN 400 MG/1
TABLET, FILM COATED ORAL
Status: DISPENSED
Start: 2022-12-01

## (undated) RX ORDER — ACETAMINOPHEN 325 MG/1
TABLET ORAL
Status: DISPENSED
Start: 2022-12-01